# Patient Record
Sex: FEMALE | Race: WHITE | NOT HISPANIC OR LATINO | Employment: FULL TIME | URBAN - METROPOLITAN AREA
[De-identification: names, ages, dates, MRNs, and addresses within clinical notes are randomized per-mention and may not be internally consistent; named-entity substitution may affect disease eponyms.]

---

## 2023-07-05 ENCOUNTER — OFFICE VISIT (OUTPATIENT)
Dept: PRIMARY CARE | Facility: CLINIC | Age: 43
End: 2023-07-05
Payer: COMMERCIAL

## 2023-07-05 VITALS
SYSTOLIC BLOOD PRESSURE: 111 MMHG | HEART RATE: 68 BPM | OXYGEN SATURATION: 99 % | BODY MASS INDEX: 21.58 KG/M2 | TEMPERATURE: 97.3 F | WEIGHT: 118 LBS | RESPIRATION RATE: 16 BRPM | DIASTOLIC BLOOD PRESSURE: 76 MMHG

## 2023-07-05 DIAGNOSIS — R11.2 NAUSEA AND VOMITING, UNSPECIFIED VOMITING TYPE: ICD-10-CM

## 2023-07-05 DIAGNOSIS — H65.92 LEFT SEROUS OTITIS MEDIA, UNSPECIFIED CHRONICITY: Primary | ICD-10-CM

## 2023-07-05 PROBLEM — J30.1 HAY FEVER: Status: ACTIVE | Noted: 2023-07-05

## 2023-07-05 PROBLEM — N60.02 BILATERAL BREAST CYSTS: Status: ACTIVE | Noted: 2023-07-05

## 2023-07-05 PROBLEM — B34.9 VIRAL INFECTION: Status: RESOLVED | Noted: 2023-07-05 | Resolved: 2023-07-05

## 2023-07-05 PROBLEM — N60.01 BILATERAL BREAST CYSTS: Status: ACTIVE | Noted: 2023-07-05

## 2023-07-05 PROBLEM — E83.119 HEMOCHROMATOSIS: Status: ACTIVE | Noted: 2023-07-05

## 2023-07-05 PROCEDURE — 99213 OFFICE O/P EST LOW 20 MIN: CPT | Performed by: NURSE PRACTITIONER

## 2023-07-05 PROCEDURE — 1036F TOBACCO NON-USER: CPT | Performed by: NURSE PRACTITIONER

## 2023-07-05 RX ORDER — FAMOTIDINE 20 MG/1
20 TABLET, FILM COATED ORAL 2 TIMES DAILY
COMMUNITY
Start: 2023-01-06

## 2023-07-05 RX ORDER — AMOXICILLIN 875 MG/1
875 TABLET, FILM COATED ORAL 2 TIMES DAILY
Qty: 20 TABLET | Refills: 0 | Status: SHIPPED | OUTPATIENT
Start: 2023-07-05 | End: 2023-07-12 | Stop reason: SDUPTHER

## 2023-07-05 RX ORDER — ONDANSETRON 4 MG/1
4 TABLET, ORALLY DISINTEGRATING ORAL EVERY 8 HOURS PRN
Qty: 20 TABLET | Refills: 0 | Status: SHIPPED | OUTPATIENT
Start: 2023-07-05 | End: 2023-07-12

## 2023-07-05 ASSESSMENT — ENCOUNTER SYMPTOMS
ANOREXIA: 0
SORE THROAT: 0
NECK STIFFNESS: 0
NECK PAIN: 0
FATIGUE: 0
DIZZINESS: 1
NUMBNESS: 0
PALPITATIONS: 0
CHANGE IN BOWEL HABIT: 0
WEAKNESS: 0
HEMATOLOGIC/LYMPHATIC NEGATIVE: 1
DIAPHORESIS: 0
RHINORRHEA: 0
COUGH: 0
NAUSEA: 1
CARDIOVASCULAR NEGATIVE: 1
ALLERGIC/IMMUNOLOGIC NEGATIVE: 1
HEADACHES: 1
JOINT SWELLING: 0
PSYCHIATRIC NEGATIVE: 1
FEVER: 0
MUSCULOSKELETAL NEGATIVE: 1
ARTHRALGIAS: 0
CHILLS: 0
VERTIGO: 0
EYES NEGATIVE: 1
ENDOCRINE NEGATIVE: 1
VOMITING: 1
MYALGIAS: 0
CONSTITUTIONAL NEGATIVE: 1
RESPIRATORY NEGATIVE: 1
SWOLLEN GLANDS: 0
VISUAL CHANGE: 0
ABDOMINAL PAIN: 0

## 2023-07-05 NOTE — PATIENT INSTRUCTIONS
Patient was seen and examined.  Diagnosis, treatment, and possible complications of today's illness discussed and explained to patient.  Patient to take medications associated with this visit.  Patient may also take OTC analgesic/antipyretic if needed for pain/fever.  Advised to increase oral fluid intake as tolerated.  Patient educated and advised to come back if worsening or persistent symptoms. Patient educated on when to seek urgent/emergent care. Patient was given opportunity to ask questions and denied any at this time.  Patient verbalized understanding and agrees with plan of care.

## 2023-07-05 NOTE — ASSESSMENT & PLAN NOTE
Patient to continue to take zyrtec and benadryl as needed.  Patient will take ondansteron as needed for nausea and vomiting.

## 2023-07-05 NOTE — PROGRESS NOTES
Subjective   Patient ID: Tee Laughlin is a 42 y.o. female who presents for Earache (Onset: 07.05.2023 /Symptoms: LEFT ear ache, dizziness, vomited once , /OTC: dramamine  /Denies:  fever,  congestion, headache, cough).  Patient presents to convenient care appointment with complaint of nausea, vomiting rolling from side to side.  Patient has not been taking any OTC  for symptoms relief.  Patient denies chest pain, shortness of breath, nor diarrhea.  Patient has been eating and drinking without difficulty.  Patient reports swimming in ocean last week.     Dizziness  This is a new problem. The current episode started yesterday. The problem occurs intermittently. The problem has been unchanged. Associated symptoms include headaches, nausea and vomiting. Pertinent negatives include no abdominal pain, anorexia, arthralgias, change in bowel habit, chest pain, chills, congestion, coughing, diaphoresis, fatigue, fever, joint swelling, myalgias, neck pain, numbness, rash, sore throat, swollen glands, urinary symptoms, vertigo, visual change or weakness. The symptoms are aggravated by twisting (rolling side to side). She has tried nothing for the symptoms.       Review of Systems   Constitutional: Negative.  Negative for chills, diaphoresis, fatigue and fever.   HENT: Negative.  Negative for congestion, ear pain, postnasal drip, rhinorrhea and sore throat. Drooling: otitis medica.   Eyes: Negative.    Respiratory: Negative.  Negative for cough.    Cardiovascular: Negative.  Negative for chest pain, palpitations and leg swelling.   Gastrointestinal:  Positive for nausea and vomiting. Negative for abdominal pain, anorexia and change in bowel habit.   Endocrine: Negative.    Genitourinary: Negative.    Musculoskeletal: Negative.  Negative for arthralgias, joint swelling, myalgias, neck pain and neck stiffness.   Skin: Negative.  Negative for rash.   Allergic/Immunologic: Negative.    Neurological:  Positive for dizziness and  headaches. Negative for vertigo, weakness and numbness.   Hematological: Negative.    Psychiatric/Behavioral: Negative.     All other systems reviewed and are negative.      /76   Pulse 68   Temp 36.3 °C (97.3 °F)   Resp 16   Wt 53.5 kg (118 lb)   SpO2 99%   BMI 21.58 kg/m²     Objective   Physical Exam  Vitals and nursing note reviewed.   Constitutional:       Appearance: Normal appearance.   HENT:      Head: Normocephalic and atraumatic.      Right Ear: Tympanic membrane, ear canal and external ear normal.      Left Ear: External ear normal.      Ears:      Comments: Moderate erythema and bulging TM left ear.  TM intact bilaterally.     Nose: Nose normal.      Mouth/Throat:      Mouth: Mucous membranes are moist.      Pharynx: Oropharynx is clear.   Eyes:      Extraocular Movements: Extraocular movements intact.      Conjunctiva/sclera: Conjunctivae normal.      Pupils: Pupils are equal, round, and reactive to light.   Cardiovascular:      Rate and Rhythm: Normal rate and regular rhythm.      Pulses: Normal pulses.      Heart sounds: Normal heart sounds.   Pulmonary:      Effort: Pulmonary effort is normal.      Breath sounds: No wheezing, rhonchi or rales.   Musculoskeletal:         General: Normal range of motion.      Cervical back: Normal range of motion and neck supple.   Skin:     General: Skin is warm and dry.      Capillary Refill: Capillary refill takes less than 2 seconds.   Neurological:      General: No focal deficit present.      Mental Status: She is alert and oriented to person, place, and time.   Psychiatric:         Mood and Affect: Mood normal.         Behavior: Behavior normal.         Assessment/Plan   Problem List Items Addressed This Visit    None

## 2023-07-12 ENCOUNTER — TELEPHONE (OUTPATIENT)
Dept: PRIMARY CARE | Facility: CLINIC | Age: 43
End: 2023-07-12
Payer: COMMERCIAL

## 2023-07-12 DIAGNOSIS — H65.92 LEFT SEROUS OTITIS MEDIA, UNSPECIFIED CHRONICITY: ICD-10-CM

## 2023-07-12 RX ORDER — AMOXICILLIN 875 MG/1
875 TABLET, FILM COATED ORAL 2 TIMES DAILY
Qty: 14 TABLET | Refills: 0 | Status: SHIPPED | OUTPATIENT
Start: 2023-07-12 | End: 2023-07-19

## 2023-07-12 NOTE — PROGRESS NOTES
Patient called office to reports she has not taken medication due to losing prescription after 3 days of treatment.  Amoxicillin 875 mg PO x 7 days sent to pharmacy.

## 2023-07-12 NOTE — TELEPHONE ENCOUNTER
Patient called stating she was prescribed amoxicillin for an ear infection on 7/5, she stated she was not finished with the prescription and has lost the pill bottle. She is requesting another prescription send to [facility]   Please advise

## 2023-09-11 ENCOUNTER — OFFICE VISIT (OUTPATIENT)
Dept: PRIMARY CARE | Facility: CLINIC | Age: 43
End: 2023-09-11
Payer: COMMERCIAL

## 2023-09-11 VITALS
BODY MASS INDEX: 22.34 KG/M2 | HEIGHT: 62 IN | HEART RATE: 68 BPM | SYSTOLIC BLOOD PRESSURE: 110 MMHG | TEMPERATURE: 98.4 F | WEIGHT: 121.4 LBS | DIASTOLIC BLOOD PRESSURE: 70 MMHG | RESPIRATION RATE: 12 BRPM

## 2023-09-11 DIAGNOSIS — M77.9 TENDONITIS: ICD-10-CM

## 2023-09-11 DIAGNOSIS — M54.31 RIGHT SIDED SCIATICA: ICD-10-CM

## 2023-09-11 DIAGNOSIS — B00.1 COLD SORE: ICD-10-CM

## 2023-09-11 DIAGNOSIS — M79.645 PAIN OF LEFT THUMB: ICD-10-CM

## 2023-09-11 DIAGNOSIS — S33.6XXA SPRAIN OF SACROILIAC LIGAMENT, INITIAL ENCOUNTER: Primary | ICD-10-CM

## 2023-09-11 PROCEDURE — 1036F TOBACCO NON-USER: CPT | Performed by: FAMILY MEDICINE

## 2023-09-11 PROCEDURE — 99213 OFFICE O/P EST LOW 20 MIN: CPT | Performed by: FAMILY MEDICINE

## 2023-09-11 RX ORDER — METHYLPREDNISOLONE 4 MG/1
TABLET ORAL
Qty: 21 TABLET | Refills: 0 | Status: SHIPPED | OUTPATIENT
Start: 2023-09-11 | End: 2023-12-15 | Stop reason: ALTCHOICE

## 2023-09-11 RX ORDER — VALACYCLOVIR HYDROCHLORIDE 500 MG/1
500 TABLET, FILM COATED ORAL 2 TIMES DAILY
Qty: 14 TABLET | Refills: 1 | Status: SHIPPED | OUTPATIENT
Start: 2023-09-11 | End: 2023-09-25

## 2023-09-11 ASSESSMENT — PATIENT HEALTH QUESTIONNAIRE - PHQ9
1. LITTLE INTEREST OR PLEASURE IN DOING THINGS: NOT AT ALL
2. FEELING DOWN, DEPRESSED OR HOPELESS: NOT AT ALL
SUM OF ALL RESPONSES TO PHQ9 QUESTIONS 1 AND 2: 0

## 2023-09-11 NOTE — PROGRESS NOTES
"Subjective   Patient ID: Tee Laughlin is a 42 y.o. female who presents for Sciatica and Thumb Pain.  HPI  Patient presents today complaining of pain in her left thumb. It has been present for 1 year. This is waxing, and waning. No injury, or swelling. Was seen last year for this. Took OTC pain reliever with relief.       Also complaining of right sciatic nerve pain x  6 weeks. Has gotten this in the past. States usually stretches, heat, and ice help, but not this time. Has been working nights on computer, and sitting a lot.     Problem list discussed.    Overall doing well.  Eating okay.  Staying active.    Has no other new problem /question.    ROS  Constitutional- No activity change. No appetite change.  Eyes- Denies vision changes.  Respiratory- No shortness of breath.  Cardiovascular- No palpitations. No chest pain.  GI- No nausea or vomiting. No diarrhea or constipation. Denies abdominal pain.  Musculoskeletal- myalgias  Extremities- No edema.  Neurological- Denies headaches. Denies dizziness.  Skin- cold sore  Psychiatric/Behavioral- Denies significant anxiety, or depressed mood.     Objective     /70   Pulse 68   Temp 36.9 °C (98.4 °F)   Resp 12   Ht 1.575 m (5' 2\")   Wt 55.1 kg (121 lb 6.4 oz)   LMP 08/07/2023 (Exact Date)   BMI 22.20 kg/m²     No Known Allergies    Constitutional-- Well-nourished.  No distress  Head- unremarkable.  Ears- TMs clear.  Eyes- PERRL.  Conjunctiva normal.  Nose- Normal.  No rhinorrhea noted.  Throat- Oropharynx is clear and moist.  Neck- Supple with no thyromegaly.  No significant cervical adenopathy noted.  Pulmonary/Chest- Breath sounds normal with normal effort.  No wheezing.  Heart- Regular rate and rhythm.  No murmur.  Abdomen- Soft and non-tender.  No masses noted.  Musculoskeletal- Normal ROM.  Mild pain with range of motion of that thumb.  Mild pain lower back tailbone area on that side with mild right-sided radiculopathy.  Extremities- No edema. "   Neurological- Alert.  No noted deficits.  Skin- Warm.  Right lower lip cold sore noted  Psychiatric/Behavioral- Mood and affect normal.  Behavior normal.     Assessment/Plan   1. Sprain of sacroiliac ligament, initial encounter  methylPREDNISolone (Medrol Dospak) 4 mg tablets      2. Right sided sciatica  methylPREDNISolone (Medrol Dospak) 4 mg tablets      3. Pain of left thumb        4. Tendonitis  methylPREDNISolone (Medrol Dospak) 4 mg tablets      5. Cold sore  valACYclovir (Valtrex) 500 mg tablet             Long talk. Treatment options reviewed.    Discussed right sided sacroiliac back pain.  Advised patient to take steroid pack as discussed.  Continue to monitor symptoms.      Discussed left sided thumb pain.  Educated on Tendonitis.      Discussed cold sore.  Advised patient to take Valtrex as directed.      Continue and take your medications as prescribed.    Health Maintenance issues discussed.    Importance of healthy diet and regular exercise regimen discussed.    We will contact you with any test results ordered. If you do not hear from us, please contact.    Follow-up as instructed or sooner if any problems or symptoms do not resolve as expected.      Scribe Attestation  By signing my name below, IDebi Scribe   attest that this documentation has been prepared under the direction and in the presence of Dontrell Blue MD.

## 2023-10-30 ENCOUNTER — OFFICE VISIT (OUTPATIENT)
Dept: PRIMARY CARE | Facility: CLINIC | Age: 43
End: 2023-10-30
Payer: COMMERCIAL

## 2023-10-30 VITALS
SYSTOLIC BLOOD PRESSURE: 110 MMHG | HEART RATE: 70 BPM | WEIGHT: 121.6 LBS | RESPIRATION RATE: 18 BRPM | DIASTOLIC BLOOD PRESSURE: 60 MMHG | BODY MASS INDEX: 22.96 KG/M2 | OXYGEN SATURATION: 98 % | TEMPERATURE: 98 F | HEIGHT: 61 IN

## 2023-10-30 DIAGNOSIS — M54.41 CHRONIC RIGHT-SIDED LOW BACK PAIN WITH RIGHT-SIDED SCIATICA: ICD-10-CM

## 2023-10-30 DIAGNOSIS — S33.6XXA SPRAIN OF SACROILIAC LIGAMENT, INITIAL ENCOUNTER: Primary | ICD-10-CM

## 2023-10-30 DIAGNOSIS — G89.29 CHRONIC RIGHT-SIDED LOW BACK PAIN WITH RIGHT-SIDED SCIATICA: ICD-10-CM

## 2023-10-30 DIAGNOSIS — G40.909 SEIZURE DISORDER (MULTI): ICD-10-CM

## 2023-10-30 PROCEDURE — 1036F TOBACCO NON-USER: CPT | Performed by: FAMILY MEDICINE

## 2023-10-30 PROCEDURE — 99213 OFFICE O/P EST LOW 20 MIN: CPT | Performed by: FAMILY MEDICINE

## 2023-10-30 RX ORDER — PREDNISONE 10 MG/1
TABLET ORAL
Qty: 24 TABLET | Refills: 0 | Status: SHIPPED | OUTPATIENT
Start: 2023-10-30 | End: 2023-12-15 | Stop reason: ALTCHOICE

## 2023-10-30 NOTE — PROGRESS NOTES
"Subjective   Patient ID: Tee Laughlin is a 43 y.o. female who presents for Back Pain.  HPI    Patient presents in office today for back pain. Ongoing for a while. Patient admits that she has tried ibuprofen OTC 3 times a day for the last few weeks. Admits to/denies any injury to her back. Admits that the pain is 5-6/10. Admits that this is sciatic nerve pain.    Taking current medications which were reviewed.  Problem list discussed.    Overall doing well.  Eating okay.  Staying active.    Has no other new problem /question.     ROS  Constitutional- No activity change. No appetite change.  Eyes- Denies vision changes.  Respiratory- No shortness of breath.  Cardiovascular- No palpitations. No chest pain.  GI- No nausea or vomiting. No diarrhea or constipation. Denies abdominal pain.  Musculoskeletal- myalgias   Extremities- No edema.  Neurological- Denies headaches. Denies dizziness.  Skin- No rashes.  Psychiatric/Behavioral- Denies significant anxiety, or depressed mood.     Objective     /60   Pulse 70   Temp 36.7 °C (98 °F) (Temporal)   Resp 18   Ht 1.549 m (5' 1\")   Wt 55.2 kg (121 lb 9.6 oz)   SpO2 98%   BMI 22.98 kg/m²     No Known Allergies    Constitutional-- Well-nourished.  No distress  Eyes- PERRL.  Conjunctiva normal.  Nose- Normal.  No rhinorrhea noted.  Throat- Oropharynx is clear and moist.  Neck- Supple with no thyromegaly.  No significant cervical adenopathy noted.  Pulmonary/Chest- Breath sounds normal with normal effort.  No wheezing.  Heart- Regular rate and rhythm.  No murmur.  Abdomen- Soft and non-tender.  No masses noted.  Musculoskeletal-right very low back pain localizing to the SI joint area extending slightly to the upper buttock.  Extremities- No edema.   Neurological- Alert.  No noted deficits.  Skin- Warm.  No rashes.      Assessment/Plan   1. Sprain of sacroiliac ligament, initial encounter  predniSONE (Deltasone) 10 mg tablet    XR lumbar spine complete 4+ views    "   2. Chronic right-sided low back pain with right-sided sciatica  XR lumbar spine complete 4+ views      3. Seizure disorder (CMS/HCC)               Long talk. Treatment options reviewed.    Discussed chronic right sided back pain.  Educated on sacroiliitis.  Advised patient to rest.  Continue to monitor symptoms.  Complete x ray.  Take steroid pack as discussed.     Continue and take your medications as prescribed.    Health Maintenance issues discussed.      We will contact you with any test results ordered. If you do not hear from us, please contact.  Further work-up and treatment depending on how she does and test results  Follow-up as instructed or sooner if any problems or symptoms do not resolve as expected.    Scribe Attestation  By signing my name below, IDebi Scribe   attest that this documentation has been prepared under the direction and in the presence of Dontrell Blue MD.

## 2023-10-31 ENCOUNTER — ANCILLARY PROCEDURE (OUTPATIENT)
Dept: RADIOLOGY | Facility: CLINIC | Age: 43
End: 2023-10-31
Payer: COMMERCIAL

## 2023-10-31 DIAGNOSIS — S33.6XXA SPRAIN OF SACROILIAC LIGAMENT, INITIAL ENCOUNTER: ICD-10-CM

## 2023-10-31 DIAGNOSIS — M54.41 CHRONIC RIGHT-SIDED LOW BACK PAIN WITH RIGHT-SIDED SCIATICA: ICD-10-CM

## 2023-10-31 DIAGNOSIS — G89.29 CHRONIC RIGHT-SIDED LOW BACK PAIN WITH RIGHT-SIDED SCIATICA: ICD-10-CM

## 2023-10-31 PROCEDURE — 72110 X-RAY EXAM L-2 SPINE 4/>VWS: CPT | Mod: FY

## 2023-10-31 PROCEDURE — 72110 X-RAY EXAM L-2 SPINE 4/>VWS: CPT | Performed by: STUDENT IN AN ORGANIZED HEALTH CARE EDUCATION/TRAINING PROGRAM

## 2023-11-02 ENCOUNTER — TELEPHONE (OUTPATIENT)
Dept: PRIMARY CARE | Facility: CLINIC | Age: 43
End: 2023-11-02
Payer: COMMERCIAL

## 2023-11-02 NOTE — TELEPHONE ENCOUNTER
----- Message from Dontrell Blue MD sent at 11/2/2023  7:15 AM EDT -----  X-ray of your lower back shows only minimal arthritis changes.  Follow-up or call if back symptoms persist in any significant way.  Please let her know

## 2023-11-06 ENCOUNTER — TELEPHONE (OUTPATIENT)
Dept: PRIMARY CARE | Facility: CLINIC | Age: 43
End: 2023-11-06
Payer: COMMERCIAL

## 2023-11-06 DIAGNOSIS — M54.41 CHRONIC RIGHT-SIDED LOW BACK PAIN WITH RIGHT-SIDED SCIATICA: ICD-10-CM

## 2023-11-06 DIAGNOSIS — G89.29 CHRONIC RIGHT-SIDED LOW BACK PAIN WITH RIGHT-SIDED SCIATICA: ICD-10-CM

## 2023-11-06 RX ORDER — MELOXICAM 15 MG/1
15 TABLET ORAL DAILY
Qty: 30 TABLET | Refills: 1 | Status: SHIPPED | OUTPATIENT
Start: 2023-11-06 | End: 2024-05-14 | Stop reason: WASHOUT

## 2023-11-06 NOTE — TELEPHONE ENCOUNTER
Please let her know that she may notice improvement in a day or 2 but it can reach a peak in a week or 2.     Patient aware

## 2023-11-06 NOTE — TELEPHONE ENCOUNTER
Patient is calling because she saw you on 10/30/23 for lower back pain and was given a Prednisone taper and states you told her to call back if it wasn't helping. She states when she was taking the first 5 days of the Prednisone, the strongest doses, it wasn't even helping with the lower back. She's on day 6 and it's not helping. Wanted to know what you advise  Thanks      Patient's # [phone]    Preferred pharmacy [facility] [place]

## 2023-11-06 NOTE — TELEPHONE ENCOUNTER
"Patient wondering how long Meloxicam takes to \"kick in\", and if there are any limitations with taking it? Please advise.  "

## 2023-11-07 NOTE — PROGRESS NOTES
Physical Therapy    Physical Therapy Evaluation    Patient Name: Tee Laughlin  MRN: [id number]  Today's Date: 11/8/23  Time Calculation  Start Time: 1355  Stop Time: 1430  Time Calculation (min): 35 min    Insurance  [facility] [place]  10% coinsurance, $40 copay   ANITA HANEY not required  Visit: 1  POC: 10    Assessment  44 y/o F presents to outpatient physical therapy with reports of low back and intermittent right lower extremity pain lasting for the last few years. The patient presents with the current impairments of pain, decreased ROM, increased muscle tension, and weakness. These impairments currently limit their ability to bend, lift, sleep, carry objects, and participate in recreation activities. Due to the limitations listed above, the patient is currently at a decreased functional level compared to baseline, and they would benefit from skilled physical therapy to improve pain intensity, strength, flexibility, and facilitate a safe and efficient return to functional baseline. Patient's prognosis for improvement with therapy is good at this time.  PT Assessment Results: Decreased strength, Decreased range of motion, Pain  Rehab Prognosis: Good  Evaluation/Treatment Tolerance: Patient tolerated treatment well    Plan  Treatment/Interventions: Aquatic therapy, Cryotherapy, Dry needling, Education/ Instruction, Electrical stimulation, Hot pack, Manual therapy, Mechanical traction, Neuromuscular re-education, Self care/ home management, Taping techniques, Therapeutic activities, Therapeutic exercises, Ultrasound, Vasopneumatic device  PT Plan: Skilled PT  PT Frequency:  (2x/week x2-3 weeks, 1x/week x3 weeks)  Duration:  (9 additional visits)  Onset Date: 11/06/23  Certification Period Start Date: 11/08/23  Certification Period End Date: 02/06/24  Rehab Potential: Good  Plan of Care Agreement: Patient    Complexity  Low    Current Problem  1. Chronic right-sided low back pain with right-sided sciatica  Referral to  Physical Therapy    Follow Up In Physical Therapy          Subjective   Patient reports to OPPT with complaints of back pain for many years. 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 In July she reports that the sciatic area on the right started to flare up. She has started to reduce her activity but the pain has only gotten worse since then. The pain is across the entire tailbone, and will intermittently shoot down the right posterior leg. Increased pain with bending, lifting, sleeping, and carrying. Pain is 5/10 at this time, 7/10 at worst, 3/10 at best. Denies numbness and tingling.   General:  General  Reason for Referral: low back pain and right lower extremity pain  Referred By: Dr. Dontrell Blue  Precautions:     Pain:  Pain Assessment: 0-10  Pain Score: 5 - Moderate pain  Pain Type: Chronic pain  Pain Location: Back  Pain Orientation: Right, Left  Pain Radiating Towards: R knee  Home Living:  Home Living Comment: Lives with  who is recently out of surgery and she is assisting as needed  Prior Function Per Pt/Caregiver Report:  Prior Function Comments: Independent with all desired activities and ADLs    Objective   Lumbar AROM:  Flexion to ankles*  Extension WFL  RSB to lateral joint line  LSB to lateral joint line     LE Strength:  Hip flexion R 4/5, L 4+/5  Hip abd R 4/5, L 4+/5  Hip ext R 4-/5*, L 4+/5  Knee flexion R 4/5, L 5/5  Knee extension R 5/5, L 5/5    HS length (90/90):  R -14*  L -10    Gait: decreased stance time on R, increased lateral sway over right lower extremity during stance  Dermatomes: intact  Palpation: TTP along the right lumbar paraspinals, R QL, right gluteus medius, and R piriformis  Observation: R anteriorly rotated illium  Joint mobility: hypermobile PA mobility L1-L5, mild increase in pain with right lateral mob to sacrum  Special Tests: - compression, - gapping, + sacral thrust, - thigh thrust, + slump on R  XR lumbar 10/30/23: No  acute findings of the lumbar spine     Treatment:  MET to correct R anteriorly rotated illium  HEP reviewed   (5')    Outcome Measures:  Other Measures  Oswestry Disablity Index (ANETA): 25; 50%     OP EDUCATION:  Education  Individual(s) Educated: Patient  Education Provided: Anatomy, Body Mechanics, Home Exercise Program, Home Safety, Physiology, POC  Risk and Benefits Discussed with Patient/Caregiver/Other: yes  Patient/Caregiver Demonstrated Understanding: yes  Plan of Care Discussed and Agreed Upon: yes  Patient Response to Education: Patient/Caregiver Verbalized Understanding of Information, Patient/Caregiver Asked Appropriate Questions  HEP:  Exercises  - Supine Piriformis Stretch with Foot on Ground  - 2 x daily - 7 x weekly - 1 sets - 3 reps - 30sec hold  - Supine Posterior Pelvic Tilt  - 1 x daily - 7 x weekly - 2 sets - 10 reps  - Supine Pelvic Tilt with Straight Leg Raise  - 1 x daily - 7 x weekly - 2 sets - 10 reps  - Supine March with Alternating Leg Lifts  - 1 x daily - 7 x weekly - 2 sets - 10 reps  Goals:  By discharge:  1. Patient will report and demonstrate independence with established HEP  2. Patient will demonstrate ability to bend and lift 10lb from the floor to waist height 5x consecutively without an increase in low back pain  3. Patient will increase gross hip and knee strength to >/= 4+/5 to improve their ability to stand, ambulate, lift, and perform all work duties without restrictions  4. Patent will report improvement in low back and right lower extremity pain by 75% since the start of therapy to allow for an increased ability to perform ADLs and work duties  5. Patient will demonstrate a decrease in Modified Oswestry Low Back Disability Index score by 10 pts to 15/50 to meet established MCID for the outcome measure (baseline 11/8/23 25/50)  6. Patient will demonstrate the ability to perform a 25# box lift and carry 50' x2 without pain in the lumbar spine exceeding 2/10 to improve her  ability to perform light-moderate activities within the home  7. Patient will report the ability to sleep through the night 4/7 nights per week uninterrupted by pain to improve overall function throughout the day  8. Patient will report the ability to return to recreation activities including working out at the gym without pain in the low back or right lower extremity exceeding 2/10 to allow for improved overall fitness level   9. Patient will demonstrate the ability to perform a static 10lb lift and twist transfer from side to side 10x consecutively without an increase in low back pain

## 2023-11-08 ENCOUNTER — EVALUATION (OUTPATIENT)
Dept: PHYSICAL THERAPY | Facility: CLINIC | Age: 43
End: 2023-11-08
Payer: COMMERCIAL

## 2023-11-08 DIAGNOSIS — G89.29 CHRONIC RIGHT-SIDED LOW BACK PAIN WITH RIGHT-SIDED SCIATICA: ICD-10-CM

## 2023-11-08 DIAGNOSIS — M54.41 CHRONIC RIGHT-SIDED LOW BACK PAIN WITH RIGHT-SIDED SCIATICA: ICD-10-CM

## 2023-11-08 PROCEDURE — 97161 PT EVAL LOW COMPLEX 20 MIN: CPT | Mod: GP | Performed by: PHYSICAL THERAPIST

## 2023-11-08 ASSESSMENT — PAIN - FUNCTIONAL ASSESSMENT: PAIN_FUNCTIONAL_ASSESSMENT: 0-10

## 2023-11-08 ASSESSMENT — PAIN SCALES - GENERAL: PAINLEVEL_OUTOF10: 5 - MODERATE PAIN

## 2023-11-08 NOTE — LETTER
November 9, 2023    Braden Frederick PT  [address] [facility]  Rehab Services  [facility] [address]    Patient: Tee Laughlin   YOB: 1980   Date of Visit: 11/8/2023       Dear Dontrell Blue Md  [address] [place] [address]    The attached plan of care is being sent to you because your patient’s medical reimbursement requires that you certify the plan of care. Your signature is required to allow uninterrupted insurance coverage.      You may indicate your approval by signing below and faxing this form back to us at Dept Fax: [phone].    Please call Dept: [phone] with any questions or concerns.    Thank you for this referral,        Braden Frederick PT  [place] [address] [facility]  [address] [facility] [address]    Payer: Payor: [facility] / Plan: [facility] / Product Type: *No Product type* /                                                                         Date:     Dear Braden Frederick PT,     Re: Ms. Tee Laughlin, MRN:[id number]    I certify that I have reviewed the attached plan of care and it is medically necessary for Ms. Tee Laughlin (1980) who is under my care.          ______________________________________                    _________________  Provider name and credentials                                           Date and time                                                                                           Plan of Care 11/9/23   Effective from: 11/9/2023  Effective to: 2/6/2024    Plan ID: [id number]            Participants as of Finalize on 11/9/2023    Name Type Comments Contact Info    Dontrell Blue MD PCP - [facility] PCP  [phone]    Braden Frederick PT Physical Therapist  [phone]       Last Plan Note     Author: Braden Frederick PT Status: Sign when Signing Visit Last edited: 11/8/2023  1:45 PM       Physical Therapy    Physical Therapy  Evaluation    Patient Name: Tee Laughlin  MRN: [id number]  Today's Date: 11/8/23  Time Calculation  Start Time: 1355  Stop Time: 1430  Time Calculation (min): 35 min    Insurance  [facility]  10% coinsurance, $40 copay   ANITA HANEY not required  Visit: 1  POC: 10    Assessment  42 y/o F presents to outpatient physical therapy with reports of low back and intermittent right lower extremity pain lasting for the last few years. The patient presents with the current impairments of pain, decreased ROM, increased muscle tension, and weakness. These impairments currently limit their ability to bend, lift, sleep, carry objects, and participate in recreation activities. Due to the limitations listed above, the patient is currently at a decreased functional level compared to baseline, and they would benefit from skilled physical therapy to improve pain intensity, strength, flexibility, and facilitate a safe and efficient return to functional baseline. Patient's prognosis for improvement with therapy is good at this time.  PT Assessment Results: Decreased strength, Decreased range of motion, Pain  Rehab Prognosis: Good  Evaluation/Treatment Tolerance: Patient tolerated treatment well    Plan  Treatment/Interventions: Aquatic therapy, Cryotherapy, Dry needling, Education/ Instruction, Electrical stimulation, Hot pack, Manual therapy, Mechanical traction, Neuromuscular re-education, Self care/ home management, Taping techniques, Therapeutic activities, Therapeutic exercises, Ultrasound, Vasopneumatic device  PT Plan: Skilled PT  PT Frequency:  (2x/week x2-3 weeks, 1x/week x3 weeks)  Duration:  (9 additional visits)  Onset Date: 11/06/23  Certification Period Start Date: 11/08/23  Certification Period End Date: 02/06/24  Rehab Potential: Good  Plan of Care Agreement: Patient    Complexity  Low    Current Problem  1. Chronic right-sided low back pain with right-sided sciatica  Referral to Physical Therapy    Follow Up In  Physical Therapy          Subjective   Patient reports to OPPT with complaints of back pain for many years. 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 In July she reports that the sciatic area on the right started to flare up. She has started to reduce her activity but the pain has only gotten worse since then. The pain is across the entire tailbone, and will intermittently shoot down the right posterior leg. Increased pain with bending, lifting, sleeping, and carrying. Pain is 5/10 at this time, 7/10 at worst, 3/10 at best. Denies numbness and tingling.   General:  General  Reason for Referral: low back pain and right lower extremity pain  Referred By: Dr. Dontrell Blue  Precautions:     Pain:  Pain Assessment: 0-10  Pain Score: 5 - Moderate pain  Pain Type: Chronic pain  Pain Location: Back  Pain Orientation: Right, Left  Pain Radiating Towards: R knee  Home Living:  Home Living Comment: Lives with  who is recently out of surgery and she is assisting as needed  Prior Function Per Pt/Caregiver Report:  Prior Function Comments: Independent with all desired activities and ADLs    Objective   Lumbar AROM:  Flexion to ankles*  Extension WFL  RSB to lateral joint line  LSB to lateral joint line     LE Strength:  Hip flexion R 4/5, L 4+/5  Hip abd R 4/5, L 4+/5  Hip ext R 4-/5*, L 4+/5  Knee flexion R 4/5, L 5/5  Knee extension R 5/5, L 5/5    HS length (90/90):  R -14*  L -10    Gait: decreased stance time on R, increased lateral sway over right lower extremity during stance  Dermatomes: intact  Palpation: TTP along the right lumbar paraspinals, R QL, right gluteus medius, and R piriformis  Observation: R anteriorly rotated illium  Joint mobility: hypermobile PA mobility L1-L5, mild increase in pain with right lateral mob to sacrum  Special Tests: - compression, - gapping, + sacral thrust, - thigh thrust, + slump on R  XR lumbar 10/30/23: No acute findings of the lumbar spine      Treatment:  MET to correct R anteriorly rotated illium  HEP reviewed   (5')    Outcome Measures:  Other Measures  Oswestry Disablity Index (ANETA): 25; 50%     OP EDUCATION:  Education  Individual(s) Educated: Patient  Education Provided: Anatomy, Body Mechanics, Home Exercise Program, Home Safety, Physiology, POC  Risk and Benefits Discussed with Patient/Caregiver/Other: yes  Patient/Caregiver Demonstrated Understanding: yes  Plan of Care Discussed and Agreed Upon: yes  Patient Response to Education: Patient/Caregiver Verbalized Understanding of Information, Patient/Caregiver Asked Appropriate Questions  HEP:  Exercises  - Supine Piriformis Stretch with Foot on Ground  - 2 x daily - 7 x weekly - 1 sets - 3 reps - 30sec hold  - Supine Posterior Pelvic Tilt  - 1 x daily - 7 x weekly - 2 sets - 10 reps  - Supine Pelvic Tilt with Straight Leg Raise  - 1 x daily - 7 x weekly - 2 sets - 10 reps  - Supine March with Alternating Leg Lifts  - 1 x daily - 7 x weekly - 2 sets - 10 reps  Goals:  By discharge:  1. Patient will report and demonstrate independence with established HEP  2. Patient will demonstrate ability to bend and lift 10lb from the floor to waist height 5x consecutively without an increase in low back pain  3. Patient will increase gross hip and knee strength to >/= 4+/5 to improve their ability to stand, ambulate, lift, and perform all work duties without restrictions  4. Patent will report improvement in low back and right lower extremity pain by 75% since the start of therapy to allow for an increased ability to perform ADLs and work duties  5. Patient will demonstrate a decrease in Modified Oswestry Low Back Disability Index score by 10 pts to 15/50 to meet established MCID for the outcome measure (baseline 11/8/23 25/50)  6. Patient will demonstrate the ability to perform a 25# box lift and carry 50' x2 without pain in the lumbar spine exceeding 2/10 to improve her ability to perform light-moderate  activities within the home  7. Patient will report the ability to sleep through the night 4/7 nights per week uninterrupted by pain to improve overall function throughout the day  8. Patient will report the ability to return to recreation activities including working out at the gym without pain in the low back or right lower extremity exceeding 2/10 to allow for improved overall fitness level   9. Patient will demonstrate the ability to perform a static 10lb lift and twist transfer from side to side 10x consecutively without an increase in low back pain          Current Participants as of 11/9/2023    Name Type Comments Contact Info    Dontrell Blue MD PCP - [facility] PCP  [phone]    Signature pending    Braden Frederick PT Physical Therapist  [phone]    Signature pending

## 2023-11-08 NOTE — LETTER
November 9, 2023     Patient: Tee Laughlin   YOB: 1980   Date of Visit: 11/8/2023       To Whom It May Concern:    It is my medical opinion that Tee Laughlin {Work release (duty restriction):00134}.    If you have any questions or concerns, please don't hesitate to call.         Sincerely,        Braden Frederick, PT    CC: No Recipients

## 2023-11-08 NOTE — PATIENT INSTRUCTIONS
Access Code: [email or web address]  URL: [email or web address]  Date: 11/08/2023  Prepared by: Braden Frederick    Exercises  - Supine Piriformis Stretch with Foot on Ground  - 2 x daily - 7 x weekly - 1 sets - 3 reps - 30sec hold  - Supine Posterior Pelvic Tilt  - 1 x daily - 7 x weekly - 2 sets - 10 reps  - Supine Pelvic Tilt with Straight Leg Raise  - 1 x daily - 7 x weekly - 2 sets - 10 reps  - Supine March with Alternating Leg Lifts  - 1 x daily - 7 x weekly - 2 sets - 10 reps

## 2023-11-08 NOTE — LETTER
November 9, 2023     Patient: Tee Laughlin   YOB: 1980   Date of Visit: 11/8/2023       To Whom it May Concern:    Tee Laughlin was seen in my clinic on 11/8/2023. She {Return to school/sport:38332}.    If you have any questions or concerns, please don't hesitate to call.         Sincerely,          Braden Frederick, PT        CC: No Recipients

## 2023-11-15 PROBLEM — M54.41 CHRONIC RIGHT-SIDED LOW BACK PAIN WITH RIGHT-SIDED SCIATICA: Status: ACTIVE | Noted: 2023-11-15

## 2023-11-15 PROBLEM — G89.29 CHRONIC RIGHT-SIDED LOW BACK PAIN WITH RIGHT-SIDED SCIATICA: Status: ACTIVE | Noted: 2023-11-15

## 2023-11-20 ENCOUNTER — TREATMENT (OUTPATIENT)
Dept: PHYSICAL THERAPY | Facility: CLINIC | Age: 43
End: 2023-11-20
Payer: COMMERCIAL

## 2023-11-20 DIAGNOSIS — G89.29 CHRONIC RIGHT-SIDED LOW BACK PAIN WITH RIGHT-SIDED SCIATICA: Primary | ICD-10-CM

## 2023-11-20 DIAGNOSIS — M54.41 CHRONIC RIGHT-SIDED LOW BACK PAIN WITH RIGHT-SIDED SCIATICA: Primary | ICD-10-CM

## 2023-11-20 PROCEDURE — 97110 THERAPEUTIC EXERCISES: CPT | Mod: GP | Performed by: PHYSICAL THERAPIST

## 2023-11-20 ASSESSMENT — PAIN SCALES - GENERAL: PAINLEVEL_OUTOF10: 3

## 2023-11-20 ASSESSMENT — PAIN - FUNCTIONAL ASSESSMENT: PAIN_FUNCTIONAL_ASSESSMENT: 0-10

## 2023-11-20 NOTE — PROGRESS NOTES
Physical Therapy    Physical Therapy Treatment    Patient Name: Tee Laughlin  MRN: [id number]  Today's Date: 11/20/2023  Time Calculation  Start Time: 1016  Stop Time: 1103  Time Calculation (min): 47 min  [facility]  10% coinsurance, $40 copay   ANITA HANEY not required  Visit: 3  POC: 10    Assessment:  Treatment session progresses therapeutic exercises targeting increasing strength of the low back, abdominals, and bilateral lower extremities this date. Patient is able to complete all new additions fully, but reports intermittent increase in pain in the low back throughout treatment. Education provided this date regarding pain rules, and anatomy/physiology of the low back. HEP updated for additional progression outside of PT sessions. She remains limited due to her current impairments, and would benefit form further skilled PT.   PT Assessment  PT Assessment Results: Decreased strength, Decreased range of motion, Pain  Rehab Prognosis: Good    Plan:  OP PT Plan  PT Plan: Skilled PT  Rehab Potential: Good  Plan of Care Agreement: Patient    Current Problem  1. Chronic right-sided low back pain with right-sided sciatica            General     General  Reason for Referral: low back pain and right lower extremity pain  Referred By: Dr. Dontrell Blue    Subjective    Patient reports that pain in the lumbar spine continues, and it fluctuates on a day to day basis. She notes she is not getting as much soreness as she originally imagined she would while doing the core stabilization exercises.   Precautions  Precautions  STEADI Fall Risk Score (The score of 4 or more indicates an increased risk of falling): 0  Pain  Pain Assessment  Pain Assessment: 0-10  Pain Score: 3  Pain Type: Chronic pain  Pain Location: Back  Pain Orientation: Right, Left    Objective   Pt demonstrates tendency to increase lumbar lordosis and hyperextend the B knees during normal weightbearing    Treatments:  Therapeutic exercises:   Supine figure 4  "stretch 3x30\"  PPT reviewed  PPT with march and knee extension x15 ea   Supine russian twist 10# x15 ea   Bridges with RSB 2x10  Paloff press BTT x15 B  Hip hikes off step x15 ea   Seated on swiss ball stir the pot 6#  Lumbar stretch with ball x10 (hold d/t increased pain)  (44')     OP EDUCATION:  Outpatient Education  Individual(s) Educated: Patient  Education Provided: Home Exercise Program  HEP:  Exercises  - Supine Figure 4 Piriformis Stretch  - 2 x daily - 7 x weekly - 1 sets - 3 reps - 30sec hold  - Standing Anti-Rotation Press with Anchored Resistance  - 1 x daily - 7 x weekly - 1 sets - 15 reps - 2-3 sec hold  Goals:  By discharge:  1. Patient will report and demonstrate independence with established HEP  2. Patient will demonstrate ability to bend and lift 10lb from the floor to waist height 5x consecutively without an increase in low back pain  3. Patient will increase gross hip and knee strength to >/= 4+/5 to improve their ability to stand, ambulate, lift, and perform all work duties without restrictions  4. Patent will report improvement in low back and right lower extremity pain by 75% since the start of therapy to allow for an increased ability to perform ADLs and work duties  5. Patient will demonstrate a decrease in Modified Oswestry Low Back Disability Index score by 10 pts to 15/50 to meet established MCID for the outcome measure (baseline 11/8/23 25/50)  6. Patient will demonstrate the ability to perform a 25# box lift and carry 50' x2 without pain in the lumbar spine exceeding 2/10 to improve her ability to perform light-moderate activities within the home  7. Patient will report the ability to sleep through the night 4/7 nights per week uninterrupted by pain to improve overall function throughout the day  8. Patient will report the ability to return to recreation activities including working out at the gym without pain in the low back or right lower extremity exceeding 2/10 to allow for " improved overall fitness level   9. Patient will demonstrate the ability to perform a static 10lb lift and twist transfer from side to side 10x consecutively without an increase in low back pain

## 2023-11-20 NOTE — PATIENT INSTRUCTIONS
Exercises  - Supine Figure 4 Piriformis Stretch  - 2 x daily - 7 x weekly - 1 sets - 3 reps - 30sec hold  - Standing Anti-Rotation Press with Anchored Resistance  - 1 x daily - 7 x weekly - 1 sets - 15 reps - 2-3 sec hold

## 2023-11-22 ENCOUNTER — TREATMENT (OUTPATIENT)
Dept: PHYSICAL THERAPY | Facility: CLINIC | Age: 43
End: 2023-11-22
Payer: COMMERCIAL

## 2023-11-22 DIAGNOSIS — G89.29 CHRONIC RIGHT-SIDED LOW BACK PAIN WITH RIGHT-SIDED SCIATICA: Primary | ICD-10-CM

## 2023-11-22 DIAGNOSIS — M54.41 CHRONIC RIGHT-SIDED LOW BACK PAIN WITH RIGHT-SIDED SCIATICA: Primary | ICD-10-CM

## 2023-11-22 PROCEDURE — 97110 THERAPEUTIC EXERCISES: CPT | Mod: GP | Performed by: PHYSICAL THERAPIST

## 2023-11-22 ASSESSMENT — PAIN SCALES - GENERAL: PAINLEVEL_OUTOF10: 4

## 2023-11-22 ASSESSMENT — PAIN - FUNCTIONAL ASSESSMENT: PAIN_FUNCTIONAL_ASSESSMENT: 0-10

## 2023-11-22 NOTE — PATIENT INSTRUCTIONS
Access Code: [email or web address]  URL: [email or web address]  Date: 11/22/2023  Prepared by: Braden Frederick    Exercises  - Hooklying Clamshell with Resistance  - 1 x daily - 7 x weekly - 1 sets - 15 reps - 5 sec hold  - Supine Hip Adduction Isometric with Ball  - 1 x daily - 7 x weekly - 1 sets - 15 reps - 5 sec hold

## 2023-11-22 NOTE — PROGRESS NOTES
"Physical Therapy    Physical Therapy Treatment    Patient Name: Tee Laughlin  MRN: [id number]  Today's Date: 11/22/2023  Time Calculation  Start Time: 1355  Stop Time: 1430  Time Calculation (min): 35 min  [facility]  10% coinsurance, $40 copay   ANITA HANEY not required  Visit: 4  POC: 10    Assessment:  Treatment session shortened secondary to patient arriving to session 10 minutes late. Therapeutic exercises progressed with new additions targeting gross hip and low back strengthening. Patient is challenged with new additions, and verbal/tactile cues required to complete fully and without compensation. HEP updated for additional progression outside of PT sessions. Her current impairments place her at a decreased functional level and she would benefit from additional skilled PT at this time.   PT Assessment  PT Assessment Results: Decreased strength, Decreased range of motion, Pain  Rehab Prognosis: Good    Plan:  OP PT Plan  PT Plan: Skilled PT  Rehab Potential: Good  Plan of Care Agreement: Patient    Current Problem  1. Chronic right-sided low back pain with right-sided sciatica              General     General  Reason for Referral: low back pain and right lower extremity pain  Referred By: Dr. Dontrell Blue    Subjective    Patient reports that after last session she felt fine, but slightly sore. However yesterday she was sitting a lot at work and pain increased after sitting for too long, pain was a 7/10. Pain has decreased today to a 4/10.   Precautions  Precautions  STEADI Fall Risk Score (The score of 4 or more indicates an increased risk of falling): 0  Pain  Pain Assessment  Pain Assessment: 0-10  Pain Score: 4  Pain Type: Chronic pain  Pain Location: Back    Objective   Pt arrives to appointment 10 minutes late    R anteriorly rotated illium    Treatments:  Therapeutic exercises:   Supine hip abd/add vs blackTB/PB 15x5\" ea   Supine figure 4 stretch 3x30\" NT  Hip flexor stretch NV  PPT reviewed  PPT with " march and knee extension x15 ea NT  Supine russian twist 10# 2x10 ea   Bridges with RSB 2x10  Paloff press BTT x15 B NT  Hip hikes off step x15 ea  Seated on swiss ball stir the pot 6# NT  Lumbar stretch with ball x10 (hold d/t increased pain)  Deadbugs x12  Quad hip extension x10 B  Open book stretch x10 B  RDL 9# x10 B  (35')     OP EDUCATION:  Outpatient Education  Individual(s) Educated: Patient  Education Provided: Home Exercise Program  HEP:  Exercises  - Hooklying Clamshell with Resistance  - 1 x daily - 7 x weekly - 1 sets - 15 reps - 5 sec hold  - Supine Hip Adduction Isometric with Ball  - 1 x daily - 7 x weekly - 1 sets - 15 reps - 5 sec hold  Goals:  By discharge:  1. Patient will report and demonstrate independence with established HEP  2. Patient will demonstrate ability to bend and lift 10lb from the floor to waist height 5x consecutively without an increase in low back pain  3. Patient will increase gross hip and knee strength to >/= 4+/5 to improve their ability to stand, ambulate, lift, and perform all work duties without restrictions  4. Patent will report improvement in low back and right lower extremity pain by 75% since the start of therapy to allow for an increased ability to perform ADLs and work duties  5. Patient will demonstrate a decrease in Modified Oswestry Low Back Disability Index score by 10 pts to 15/50 to meet established MCID for the outcome measure (baseline 11/8/23 25/50)  6. Patient will demonstrate the ability to perform a 25# box lift and carry 50' x2 without pain in the lumbar spine exceeding 2/10 to improve her ability to perform light-moderate activities within the home  7. Patient will report the ability to sleep through the night 4/7 nights per week uninterrupted by pain to improve overall function throughout the day  8. Patient will report the ability to return to recreation activities including working out at the gym without pain in the low back or right lower  extremity exceeding 2/10 to allow for improved overall fitness level   9. Patient will demonstrate the ability to perform a static 10lb lift and twist transfer from side to side 10x consecutively without an increase in low back pain

## 2023-11-27 ENCOUNTER — TREATMENT (OUTPATIENT)
Dept: PHYSICAL THERAPY | Facility: CLINIC | Age: 43
End: 2023-11-27
Payer: COMMERCIAL

## 2023-11-27 DIAGNOSIS — G89.29 CHRONIC RIGHT-SIDED LOW BACK PAIN WITH RIGHT-SIDED SCIATICA: Primary | ICD-10-CM

## 2023-11-27 DIAGNOSIS — M54.41 CHRONIC RIGHT-SIDED LOW BACK PAIN WITH RIGHT-SIDED SCIATICA: Primary | ICD-10-CM

## 2023-11-27 PROCEDURE — 97110 THERAPEUTIC EXERCISES: CPT | Mod: GP | Performed by: PHYSICAL THERAPIST

## 2023-11-27 ASSESSMENT — PAIN SCALES - GENERAL: PAINLEVEL_OUTOF10: 6

## 2023-11-27 ASSESSMENT — PAIN - FUNCTIONAL ASSESSMENT: PAIN_FUNCTIONAL_ASSESSMENT: 0-10

## 2023-11-27 NOTE — PATIENT INSTRUCTIONS
Access Code: [email or web address]  URL: [email or web address]  Date: 11/27/2023  Prepared by: Braden Frederick    Exercises  - Bird Dog  - 1 x daily - 7 x weekly - 2 sets - 10 reps  - Supine Dead Bug with Leg Extension  - 1 x daily - 7 x weekly - 2 sets - 10 reps

## 2023-11-27 NOTE — PROGRESS NOTES
"Physical Therapy    Physical Therapy Treatment    Patient Name: Tee Laughlin  MRN: [id number]  Today's Date: 11/27/2023  Time Calculation  Start Time: 1350  Stop Time: 1430  Time Calculation (min): 40 min  [facility]  10% coinsurance, $40 copay   BMN [place] HANEY not required  Visit: 5  POC: 10    Assessment:  PT session focuses on increasing core and low back strength within patient's pain tolerance. Patient's fitness level places her at a higher functional baseline than average, which allows for more difficult exercises to be performed. However, PT continually reminds the patient to avoid overworking the lumbar spine throughout the day. Patient reports decreased pain following muscle energy correction to the pelvis. She remains below her baseline function at this time, and requires additional skilled PT at this time.   PT Assessment  PT Assessment Results: Decreased strength, Decreased range of motion, Pain  Rehab Prognosis: Good    Plan:  OP PT Plan  PT Plan: Skilled PT  Rehab Potential: Good  Plan of Care Agreement: Patient    Current Problem  1. Chronic right-sided low back pain with right-sided sciatica                General     General  Reason for Referral: low back pain and right lower extremity pain  Referred By: Dr. Dontrell Blue    Subjective    Patient reports that her back is hurting worse this session. She notes that she is starting to experience the increase in pain in both sides of the back since Saturday, rather than just on the right side.   Precautions  Precautions  STEADI Fall Risk Score (The score of 4 or more indicates an increased risk of falling): 0  Pain  Pain Assessment  Pain Assessment: 0-10  Pain Score: 6  Pain Type: Chronic pain  Pain Location: Back    Objective   R anteriorly rotated illium    Verbal and tactile cues required to limit twisting in the lumbar spine    Treatments:  Therapeutic exercises:   Supine hip abd/add vs blackTB/PB 15x5\" ea   Supine figure 4 stretch 3x30\" NT  Hip flexor " stretch NV  PPT with march and knee extension x15 ea  Supine russian twist 10# 2x10 ea NT  Bridges with RSB x10  Paloff press BTT x15 B NT  Hip hikes off step x15 ea NT  Seated on swiss ball stir the pot 6# x15 CW/CCW  Lumbar stretch with ball x10 (hold d/t increased pain)  Deadbugs x12  Quad hip extension x10 B NT  Open book stretch x10 B   Bird dogs x15 ea  RDL 9# x12 B  (39')     OP EDUCATION:  Outpatient Education  Individual(s) Educated: Patient  Education Provided: Home Exercise Program  Exercises  - Bird Dog  - 1 x daily - 7 x weekly - 2 sets - 10 reps  - Supine Dead Bug with Leg Extension  - 1 x daily - 7 x weekly - 2 sets - 10 reps  Goals:  By discharge:  1. Patient will report and demonstrate independence with established HEP  2. Patient will demonstrate ability to bend and lift 10lb from the floor to waist height 5x consecutively without an increase in low back pain  3. Patient will increase gross hip and knee strength to >/= 4+/5 to improve their ability to stand, ambulate, lift, and perform all work duties without restrictions  4. Patent will report improvement in low back and right lower extremity pain by 75% since the start of therapy to allow for an increased ability to perform ADLs and work duties  5. Patient will demonstrate a decrease in Modified Oswestry Low Back Disability Index score by 10 pts to 15/50 to meet established MCID for the outcome measure (baseline 11/8/23 25/50)  6. Patient will demonstrate the ability to perform a 25# box lift and carry 50' x2 without pain in the lumbar spine exceeding 2/10 to improve her ability to perform light-moderate activities within the home  7. Patient will report the ability to sleep through the night 4/7 nights per week uninterrupted by pain to improve overall function throughout the day  8. Patient will report the ability to return to recreation activities including working out at the gym without pain in the low back or right lower extremity exceeding  2/10 to allow for improved overall fitness level   9. Patient will demonstrate the ability to perform a static 10lb lift and twist transfer from side to side 10x consecutively without an increase in low back pain

## 2023-11-29 ENCOUNTER — TREATMENT (OUTPATIENT)
Dept: PHYSICAL THERAPY | Facility: CLINIC | Age: 43
End: 2023-11-29
Payer: COMMERCIAL

## 2023-11-29 DIAGNOSIS — M54.41 CHRONIC RIGHT-SIDED LOW BACK PAIN WITH RIGHT-SIDED SCIATICA: Primary | ICD-10-CM

## 2023-11-29 DIAGNOSIS — G89.29 CHRONIC RIGHT-SIDED LOW BACK PAIN WITH RIGHT-SIDED SCIATICA: Primary | ICD-10-CM

## 2023-11-29 PROCEDURE — 97140 MANUAL THERAPY 1/> REGIONS: CPT | Mod: GP,CQ

## 2023-11-29 PROCEDURE — 97110 THERAPEUTIC EXERCISES: CPT | Mod: GP,CQ

## 2023-11-29 ASSESSMENT — PAIN - FUNCTIONAL ASSESSMENT: PAIN_FUNCTIONAL_ASSESSMENT: 0-10

## 2023-11-29 ASSESSMENT — PAIN SCALES - GENERAL: PAINLEVEL_OUTOF10: 5 - MODERATE PAIN

## 2023-11-29 NOTE — PROGRESS NOTES
"Physical Therapy    Physical Therapy Treatment    Patient Name: Tee Laughlin  MRN: [id number]  Today's Date: 11/29/2023  Time Calculation  Start Time: 0145  Stop Time: 0235  Time Calculation (min): 50 min  [facility]  10% coinsurance, $40 copay   ANITA HANEY not required  Visit: 6  POC: 10    Assessment:  Decreased malalignment/ muscle tightness noted after manual.  Pt instructed to focus on increasing flexibility of B hip flexors and strength of B glutes this next week. Pt reports \"it feels a little looser\" after tx. Pt continues to need reminding to avoid overworking the lumbar spine throughout the day. Patient reports decreased pain following muscle energy correction to the pelvis. She remains below her baseline function at this time, and requires additional skilled PT at this time.        Plan:  OP PT Plan  PT Plan: Skilled PT    Current Problem  1. Chronic right-sided low back pain with right-sided sciatica                Subjective    Patient reports that her back is  about the same as last tx. Notes picking up and swinging cases of pop and it felt better after that. Still notes pain w/ sitting ~ 20+ mins.    Precautions  Precautions  STEADI Fall Risk Score (The score of 4 or more indicates an increased risk of falling): 0  Pain  Pain Assessment  Pain Assessment: 0-10  Pain Score: 5 - Moderate pain    Objective   R anteriorly rotated illium x2    Verbal and tactile cues to decrease compensation of spine.    Treatments:  Therapeutic exercises:   Supine hip abd/add vs blackTB/PB 15x5\" ea   Supine figure 4 stretch 3x30\" NT  Hip flexor stretch NV  PPT with march and knee extension x15 ea  Supine russian twist 10# 2x10 ea NT  Bridges with RSB x10  Paloff press BTT x15 B NT  Hip hikes off step x15 ea NT  Seated on swiss ball stir the pot 6# x15 CW/CCW  NT  Lumbar stretch with ball x10 (hold d/t increased pain) NT  Deadbugs x12   Quad hip extension x10 B NT  Open book stretch x10 B NT  Bird dogs x15 ea  RDL 9# x12 B " "NT  B Hip flexor (runner stretch or off table supine) 3x30\"ea  prone hip ext (knee flex and ext) w/ 2 pillows x10ea (NO BACK EXT)  piriformis stretch off side of bed 3x30\"ea      Manual:  MET to pelvis x2. DTM/ TPR to R glute min/ medius/ B lats/ B hip flexors.    OP EDUCATION:   Exercises  - Bird Dog  - 1 x daily - 7 x weekly - 2 sets - 10 reps  - Supine Dead Bug with Leg Extension  - 1 x daily - 7 x weekly - 2 sets - 10 reps    11/29/23:  B Hip flexor (runner stretch or off table supine), prone hip ext (knee flex and ext) w/ 2 pillows, piriformis stretch off side of bed.    Goals:  By discharge:  1. Patient will report and demonstrate independence with established HEP  2. Patient will demonstrate ability to bend and lift 10lb from the floor to waist height 5x consecutively without an increase in low back pain  3. Patient will increase gross hip and knee strength to >/= 4+/5 to improve their ability to stand, ambulate, lift, and perform all work duties without restrictions  4. Patent will report improvement in low back and right lower extremity pain by 75% since the start of therapy to allow for an increased ability to perform ADLs and work duties  5. Patient will demonstrate a decrease in Modified Oswestry Low Back Disability Index score by 10 pts to 15/50 to meet established MCID for the outcome measure (baseline 11/8/23 25/50)  6. Patient will demonstrate the ability to perform a 25# box lift and carry 50' x2 without pain in the lumbar spine exceeding 2/10 to improve her ability to perform light-moderate activities within the home  7. Patient will report the ability to sleep through the night 4/7 nights per week uninterrupted by pain to improve overall function throughout the day  8. Patient will report the ability to return to recreation activities including working out at the gym without pain in the low back or right lower extremity exceeding 2/10 to allow for improved overall fitness level   9. Patient will " demonstrate the ability to perform a static 10lb lift and twist transfer from side to side 10x consecutively without an increase in low back pain

## 2023-12-04 ENCOUNTER — TELEPHONE (OUTPATIENT)
Dept: PRIMARY CARE | Facility: CLINIC | Age: 43
End: 2023-12-04

## 2023-12-04 ENCOUNTER — TREATMENT (OUTPATIENT)
Dept: PHYSICAL THERAPY | Facility: CLINIC | Age: 43
End: 2023-12-04
Payer: COMMERCIAL

## 2023-12-04 DIAGNOSIS — G89.29 CHRONIC RIGHT-SIDED LOW BACK PAIN WITH RIGHT-SIDED SCIATICA: Primary | ICD-10-CM

## 2023-12-04 DIAGNOSIS — M54.41 CHRONIC RIGHT-SIDED LOW BACK PAIN WITH RIGHT-SIDED SCIATICA: Primary | ICD-10-CM

## 2023-12-04 PROCEDURE — 97110 THERAPEUTIC EXERCISES: CPT | Mod: GP | Performed by: PHYSICAL THERAPIST

## 2023-12-04 ASSESSMENT — PAIN SCALES - GENERAL: PAINLEVEL_OUTOF10: 4

## 2023-12-04 ASSESSMENT — PAIN - FUNCTIONAL ASSESSMENT: PAIN_FUNCTIONAL_ASSESSMENT: 0-10

## 2023-12-04 NOTE — PROGRESS NOTES
"Physical Therapy    Physical Therapy Treatment    Patient Name: Tee Laughlin  MRN: [id number]  Today's Date: 12/4/2023  Time Calculation  Start Time: 1348  Stop Time: 1430  Time Calculation (min): 42 min  [facility]  10% coinsurance, $40 copay   ANITA HANEY not required  Visit: 6  POC: 10    Assessment:  Treatment session focuses on therapeutic exercises targeting gross strengthening of the low back and lower extremities. Patient reports mild increase in pain in the right sided QL/proximal gluteals throughout treatment. Increased difficulty with activation of the right quadratus lumborum. HEP updated this date for additional progression outside of PT sessions. She remains below her baseline function at this time, and PT recommends that she follow up with referring physician as PT treatment continues.   PT Assessment  PT Assessment Results: Decreased strength, Decreased range of motion, Pain  Evaluation/Treatment Tolerance: Patient tolerated treatment well    Plan:  OP PT Plan  PT Plan: Skilled PT    Current Problem  1. Chronic right-sided low back pain with right-sided sciatica               General  Reason for Referral: low back pain and right lower extremity pain  Referred By: Dr. Dontrell Blue  Subjective    Patient reports that she feels the same, and the back pain remains high when she is sitting for extended periods of time, and performing lifting tasks at home. She notes that she hasn't contacted the doctor yet.     Precautions  Precautions  STEADI Fall Risk Score (The score of 4 or more indicates an increased risk of falling): 0  Pain  Pain Assessment  Pain Assessment: 0-10  Pain Score: 4  Pain Location: Back    Objective   Verbal cues to decrease trunk rotation during bird dogs    Treatments:  Therapeutic exercises  Supine hip abd/add vs blackTB/PB 15x5\" ea   Supine figure 4 stretch 3x30\" NT  Hip flexor stretch NV  PPT with march and knee extension x15 ea NT  Supine russian twist 10# 2x10 ea NT  Bridges with " "RSB x10 NT  Paloff press BTT x15 B NT  Hip hikes off step x15 ea NT  Clamshells L2 x15  Seated on swiss ball stir the pot 6# x15 CW/CCW  NT  Lumbar stretch with ball x10 (hold d/t increased pain) NT  Deadbugs x12 NT  Quad hip extension x10 B NT  Open book stretch x10 B NT  Bird dogs x12 ea  RDL 9# x12 B NT  B Hip flexor (runner stretch or off table supine) 3x30\"ea NT  prone hip ext (knee flex and ext) w/ 2 pillows x10ea (NO BACK EXT)  piriformis stretch off side of bed 3x30\"ea  Includes LAD to L hip 2x30  Bridge with single leg raise x15 B  Quad QL stretch 10x10\"  Quad hip hike x10 B  (39')    Goals:  By discharge:  1. Patient will report and demonstrate independence with established HEP  2. Patient will demonstrate ability to bend and lift 10lb from the floor to waist height 5x consecutively without an increase in low back pain  3. Patient will increase gross hip and knee strength to >/= 4+/5 to improve their ability to stand, ambulate, lift, and perform all work duties without restrictions  4. Patent will report improvement in low back and right lower extremity pain by 75% since the start of therapy to allow for an increased ability to perform ADLs and work duties  5. Patient will demonstrate a decrease in Modified Oswestry Low Back Disability Index score by 10 pts to 15/50 to meet established MCID for the outcome measure (baseline 11/8/23 25/50)  6. Patient will demonstrate the ability to perform a 25# box lift and carry 50' x2 without pain in the lumbar spine exceeding 2/10 to improve her ability to perform light-moderate activities within the home  7. Patient will report the ability to sleep through the night 4/7 nights per week uninterrupted by pain to improve overall function throughout the day  8. Patient will report the ability to return to recreation activities including working out at the gym without pain in the low back or right lower extremity exceeding 2/10 to allow for improved overall fitness level "   9. Patient will demonstrate the ability to perform a static 10lb lift and twist transfer from side to side 10x consecutively without an increase in low back pain

## 2023-12-04 NOTE — TELEPHONE ENCOUNTER
----- Message from Tee Laughlin sent at 12/4/2023  2:56 PM EST -----  Regarding: Lower back pain - right side  Contact: [phone]  On license of [facility] Dr. Blue,  I have been going to Physical Therapy for a while and stretching and I still have pain in my back daily. While the stretching has helped the acute pain and allowed me to move more I still have daily back pain. The back pain is waking me up at night as well.  I stopped taking the muscle relaxer the week of Thanksgiving. I’m not interested in staying on the muscle relaxer long term.  Please let me know what the next steps are for care. Orthopedics, visit with you, other.  Thank you,  Tee Laughlin

## 2023-12-06 ENCOUNTER — OFFICE VISIT (OUTPATIENT)
Dept: PRIMARY CARE | Facility: CLINIC | Age: 43
End: 2023-12-06
Payer: COMMERCIAL

## 2023-12-06 ENCOUNTER — TREATMENT (OUTPATIENT)
Dept: PHYSICAL THERAPY | Facility: CLINIC | Age: 43
End: 2023-12-06
Payer: COMMERCIAL

## 2023-12-06 VITALS
TEMPERATURE: 97.1 F | SYSTOLIC BLOOD PRESSURE: 100 MMHG | BODY MASS INDEX: 23.45 KG/M2 | DIASTOLIC BLOOD PRESSURE: 60 MMHG | HEART RATE: 80 BPM | HEIGHT: 61 IN | RESPIRATION RATE: 18 BRPM | WEIGHT: 124.2 LBS | OXYGEN SATURATION: 98 %

## 2023-12-06 DIAGNOSIS — G89.29 CHRONIC RIGHT-SIDED LOW BACK PAIN WITH RIGHT-SIDED SCIATICA: Primary | ICD-10-CM

## 2023-12-06 DIAGNOSIS — G40.909 SEIZURE DISORDER (MULTI): ICD-10-CM

## 2023-12-06 DIAGNOSIS — M54.41 CHRONIC RIGHT-SIDED LOW BACK PAIN WITH RIGHT-SIDED SCIATICA: Primary | ICD-10-CM

## 2023-12-06 PROCEDURE — 99213 OFFICE O/P EST LOW 20 MIN: CPT | Performed by: FAMILY MEDICINE

## 2023-12-06 PROCEDURE — 1036F TOBACCO NON-USER: CPT | Performed by: FAMILY MEDICINE

## 2023-12-06 PROCEDURE — 97110 THERAPEUTIC EXERCISES: CPT | Mod: GP | Performed by: PHYSICAL THERAPIST

## 2023-12-06 RX ORDER — CELECOXIB 200 MG/1
200 CAPSULE ORAL 2 TIMES DAILY
Qty: 60 CAPSULE | Refills: 3 | Status: SHIPPED | OUTPATIENT
Start: 2023-12-06 | End: 2024-06-03

## 2023-12-06 ASSESSMENT — PAIN SCALES - GENERAL: PAINLEVEL_OUTOF10: 3

## 2023-12-06 ASSESSMENT — PAIN - FUNCTIONAL ASSESSMENT: PAIN_FUNCTIONAL_ASSESSMENT: 0-10

## 2023-12-06 NOTE — PROGRESS NOTES
"Subjective   Patient ID: Tee Laughlin is a 43 y.o. female who presents for Back Pain.  HPI    Patient presents in office today for back pain. Ongoing for a while. Patient admits that she has been going to physical therapy. Admits that she still has daily back pain. Admits that she did stop the muscle relaxer the week of thaksgiving due to not wanting to take this long term. Patient would like to know what other options she has. Pain has been 5/10 consistently. OTC Magnesium.  Feels physical therapy has helped some and the pain overall is better.  Does not have a going down her leg anymore.    Taking current medications which were reviewed.  Problem list discussed.    Eating okay.  Staying active.    Has no other new problem /question.     ROS  Constitutional- No activity change. No appetite change.  Eyes- Denies vision changes.  Respiratory- No shortness of breath.  Cardiovascular- No palpitations. No chest pain.  GI- No nausea or vomiting. No diarrhea or constipation. Denies abdominal pain.  Musculoskeletal- myalgias   Extremities- No edema.  Neurological- Denies headaches. Denies dizziness.  Skin- No rashes.  Psychiatric/Behavioral- Denies significant anxiety, or depressed mood.     Objective     /60   Pulse 80   Temp 36.2 °C (97.1 °F) (Temporal)   Resp 18   Ht 1.549 m (5' 1\")   Wt 56.3 kg (124 lb 3.2 oz)   SpO2 98%   BMI 23.47 kg/m²     No Known Allergies    Constitutional-- Well-nourished.  No distress  Neck- Supple with no thyromegaly.  No significant cervical adenopathy noted.  Pulmonary/Chest- Breath sounds normal with normal effort.  No wheezing.  Heart- Regular rate and rhythm.  No murmur.  Abdomen- Soft and non-tender.  No masses noted.  Musculoskeletal-mild low back pain mostly on the right side localizing to the SI joint area.  Mild lower lumbar pain.  X-ray reviewed with her.  Extremities- No edema.   Neurological- Alert.  No noted deficits.  Skin- Warm.   Psychiatric/Behavioral- Mood " and affect normal.      Assessment/Plan   1. Chronic right-sided low back pain with right-sided sciatica  celecoxib (CeleBREX) 200 mg capsule      2. Seizure disorder (CMS/HCC)               Long talk. Treatment options reviewed.    Discussed lower back pain.    Treatment options reviewed.  Will switch to Celebrex to see if this does not make a bigger difference.  Continue physical therapy.  Continue and take your medications as prescribed.    Health Maintenance issues discussed.    Importance of healthy diet and regular exercise regimen discussed.    Further workup and possible orthopedic referral depending on how she responds  Follow-up as instructed or sooner if any problems or symptoms do not resolve as expected.            Scribe Attestation  By signing my name below, IDebi Scribe   attest that this documentation has been prepared under the direction and in the presence of Dontrell Blue MD.

## 2023-12-06 NOTE — PROGRESS NOTES
Physical Therapy    Physical Therapy Treatment    Patient Name: Tee Laughlin  MRN: [id number]  Today's Date: 12/6/2023  Time Calculation  Start Time: 1348  Stop Time: 1430  Time Calculation (min): 42 min  [facility]  10% coinsurance, $40 copay   ANITA HANEY not required  Visit: 7  POC: 10    Assessment:  Treatment session focuses on therapeutic exercises targeting improving strength and stability of the low back, bilateral hips, and core. Patient reports intermittent pain throughout the session in the bilateral low back, right proximal glutes, and right lateral hip. Despite increased pain, patient is able to complete all activities fully. Pain reduces with long axis distraction of the right hip. Pain has improved overall since starting PT, and patient is now able to complete more activities with less restriction. However, pain fluctuates greatly, and lasting pain relief has not been achieve in the lumbar spine. PT to consider DN treatment in the future for possible pain relief. Patient is set to follow up with referring physician later today and PT will adjust POC as needed.   PT Assessment  PT Assessment Results: Decreased strength, Decreased range of motion, Pain    Plan:  OP PT Plan  PT Plan: Skilled PT    Current Problem  1. Chronic right-sided low back pain with right-sided sciatica                 General  Reason for Referral: low back pain and right lower extremity pain  Referred By: Dr. Dontrell Blue  Subjective    Patient reports that she had a good amount of pain at the end of the day last night right across her back, but she was sitting a lot at work. She notes that after last visit she had to put some ice on it.     Precautions  Precautions  STEADI Fall Risk Score (The score of 4 or more indicates an increased risk of falling): 0  Pain  Pain Assessment  Pain Assessment: 0-10  Pain Score: 3  Pain Location: Back    Objective   Decreased pain with LAD of the lumbar spine    Tactile cues to decrease lumbar  "rotation with quadruped hip hikes    Treatments:  Therapeutic exercises:  Upright bike discontinued after 4' d/t pain  Supine hip abd/add vs blackTB/PB 15x5\" ea NT  Supine figure 4 stretch 3x30\" NT  Hip flexor stretch NV  PPT with march and knee extension x15 ea NT  Supine russian twist 10# 2x10 ea  Bridges with RSB x10 NT  Paloff press BTT x15 B NT  Hip hikes off step x15 ea NT  Clamshells L2 x15 B  Seated on swiss ball stir the pot 6# x15 CW/CCW  NT  Lumbar stretch with ball x10 (hold d/t increased pain) NT  Deadbugs x12 NT  Quad hip extension x10 B NT  Open book stretch x10 B NT  Bird dogs x12 ea  RDL 9# x12 B NT  B Hip flexor (runner stretch or off table supine) 3x30\"ea NT  prone hip ext (knee flex and ext) w/ 2 pillows x10ea (NO BACK EXT) NT  piriformis stretch off side of bed 3x30\"ea NT  Includes LAD to L hip 2x30  Bridge with single leg raise x15 B  Quad QL stretch 10x10\"  Quad hip hike x10 B  Reverse crunch 2x10  (39')    Goals:  By discharge:  1. Patient will report and demonstrate independence with established HEP  2. Patient will demonstrate ability to bend and lift 10lb from the floor to waist height 5x consecutively without an increase in low back pain  3. Patient will increase gross hip and knee strength to >/= 4+/5 to improve their ability to stand, ambulate, lift, and perform all work duties without restrictions  4. Patent will report improvement in low back and right lower extremity pain by 75% since the start of therapy to allow for an increased ability to perform ADLs and work duties  5. Patient will demonstrate a decrease in Modified Oswestry Low Back Disability Index score by 10 pts to 15/50 to meet established MCID for the outcome measure (baseline 11/8/23 25/50)  6. Patient will demonstrate the ability to perform a 25# box lift and carry 50' x2 without pain in the lumbar spine exceeding 2/10 to improve her ability to perform light-moderate activities within the home  7. Patient will report the " ability to sleep through the night 4/7 nights per week uninterrupted by pain to improve overall function throughout the day  8. Patient will report the ability to return to recreation activities including working out at the gym without pain in the low back or right lower extremity exceeding 2/10 to allow for improved overall fitness level   9. Patient will demonstrate the ability to perform a static 10lb lift and twist transfer from side to side 10x consecutively without an increase in low back pain

## 2023-12-06 NOTE — TELEPHONE ENCOUNTER
CAN WE GO AHEAD AND SCHEDULE HER IN ONE OF YOUR OPEN SLOTS OR IS THERE SOMEWHERE IN PARTICULAR YOU WOULD LIKE FOR US TO SCHEDULE?  PLEASE ADVISE.

## 2023-12-13 ENCOUNTER — TREATMENT (OUTPATIENT)
Dept: PHYSICAL THERAPY | Facility: CLINIC | Age: 43
End: 2023-12-13
Payer: COMMERCIAL

## 2023-12-13 ENCOUNTER — TELEPHONE (OUTPATIENT)
Dept: PRIMARY CARE | Facility: CLINIC | Age: 43
End: 2023-12-13

## 2023-12-13 DIAGNOSIS — G89.29 CHRONIC RIGHT-SIDED LOW BACK PAIN WITH RIGHT-SIDED SCIATICA: Primary | ICD-10-CM

## 2023-12-13 DIAGNOSIS — M54.41 CHRONIC RIGHT-SIDED LOW BACK PAIN WITH RIGHT-SIDED SCIATICA: Primary | ICD-10-CM

## 2023-12-13 PROCEDURE — 97140 MANUAL THERAPY 1/> REGIONS: CPT | Mod: GP,CQ

## 2023-12-13 PROCEDURE — 97110 THERAPEUTIC EXERCISES: CPT | Mod: GP,CQ

## 2023-12-13 RX ORDER — TIZANIDINE 4 MG/1
4 TABLET ORAL EVERY 8 HOURS PRN
Qty: 30 TABLET | Refills: 1 | Status: SHIPPED | OUTPATIENT
Start: 2023-12-13 | End: 2024-01-02

## 2023-12-13 ASSESSMENT — PAIN - FUNCTIONAL ASSESSMENT: PAIN_FUNCTIONAL_ASSESSMENT: 0-10

## 2023-12-13 ASSESSMENT — PAIN SCALES - GENERAL: PAINLEVEL_OUTOF10: 7

## 2023-12-13 NOTE — TELEPHONE ENCOUNTER
Patient call in--stated she have been taking celecoxib 200 mg twice a day. Patient stated it has not been working. Patient stated pain have increase this week.      Please advised

## 2023-12-13 NOTE — PROGRESS NOTES
"Physical Therapy    Physical Therapy Treatment    Patient Name: Tee Laughlin  MRN: [id number]  Today's Date: 12/13/2023  Time Calculation  Start Time: 0150  Stop Time: 0235  Time Calculation (min): 45 min  [facility] [place]  10% coinsurance, $40 copay   BMN [place] HANEY not required  Visit: 8  POC: 10    Assessment:  Decreased malalignment/ muscle tightness noted after manual.  Pt reports \"it feels much better\" after tx. Patient would benefit from P.T. to continue to address impairments in order to improve strength, flexibility, posture, and  to decrease symptoms and increase overall function.    PT Assessment  Evaluation/Treatment Tolerance: Patient tolerated treatment well, Patient limited by pain    Plan:  Monitor alignment.  OP PT Plan  PT Plan: Skilled PT  Current Problem  1. Chronic right-sided low back pain with right-sided sciatica             General  Reason for Referral: low back pain and right lower extremity pain  Referred By: Dr. Dontrell Blue  Subjective    Patient reports 7/10 LBP/ hip pain today for no known reason. Notes sxs in R mid hamstring again.    Precautions  Precautions  STEADI Fall Risk Score (The score of 4 or more indicates an increased risk of falling): 0  Pain  Pain Assessment  Pain Assessment: 0-10  Pain Score: 7  Pain Location: Back    Objective   R long-short.  T10-L5 ERSl.  R T12-L5 FRSR    B paraspinals very tight.    Treatments:  Therapeutic exercises:  PPT 5\"x10  DK2C 3x30\"  Foam Roll glutes/ paraspinals x7'  Siting pt ed    Manual:  MET to:  R long-short.  T10-L5 ERSl.  R T12-L5 FRSR    Sacral torsion jt mob in to ext. Sacral stretch in to ext (gr4).     DTM/ TPR to B glute min/ medius and hip flexors.    R hip inf/post jt mobs (gr4).      Goals:  By discharge:  1. Patient will report and demonstrate independence with established HEP  2. Patient will demonstrate ability to bend and lift 10lb from the floor to waist height 5x consecutively without an increase in low back pain  3. " Patient will increase gross hip and knee strength to >/= 4+/5 to improve their ability to stand, ambulate, lift, and perform all work duties without restrictions  4. Patent will report improvement in low back and right lower extremity pain by 75% since the start of therapy to allow for an increased ability to perform ADLs and work duties  5. Patient will demonstrate a decrease in Modified Oswestry Low Back Disability Index score by 10 pts to 15/50 to meet established MCID for the outcome measure (baseline 11/8/23 25/50)  6. Patient will demonstrate the ability to perform a 25# box lift and carry 50' x2 without pain in the lumbar spine exceeding 2/10 to improve her ability to perform light-moderate activities within the home  7. Patient will report the ability to sleep through the night 4/7 nights per week uninterrupted by pain to improve overall function throughout the day  8. Patient will report the ability to return to recreation activities including working out at the gym without pain in the low back or right lower extremity exceeding 2/10 to allow for improved overall fitness level   9. Patient will demonstrate the ability to perform a static 10lb lift and twist transfer from side to side 10x consecutively without an increase in low back pain

## 2023-12-14 NOTE — PROGRESS NOTES
Subjective   Patient ID: Tee Laughlin is a 43 y.o. female who presents for Mass.  HPI  Dr. Blue patient presents today complaining of a lump left axilla. Noticed it 5 days ago. This is unchanged. No pain, redness, drainage, or itchiness. States it feels hard.       Has no other new problem /question.    Review of Systems  Constitutional:  no chills, no fever and no night sweats.  Eyes: no blurred vision and no eyesight problems.  ENT: no hearing loss, no nasal congestion, no hoarseness and no sore throat.  Neck: no mass (es) and no swelling.  Cardiovascular: no chest pain, no intermittent leg claudication, no lower extremity edema, no palpitation and no syncope.  Respiratory: no cough, no shortness of breath during exertion, no shortness of breath at rest and no wheezing.  Gastrointestinal: no abdominal pain, no blood in stools, no constipation, no diarrhea, no melena, no nausea, no rectal pain and no vomiting.  Genitourinary: no dysuria, no change in urinary frequency, no urinary hesitancy and no feelings of urinary urgency.  Musculoskeletal: no arthralgias, no back pain and no myalgias.  Integumentary: no new skin lesions and no rashes.  Neurological: no difficulty walking, no headache, no limb weakness, no numbness and no tingling.  Psychiatric/Behavioral: no anxiety, no depression, no anhedonia and no substance use disorders.  Endocrine: no recent weight gain and no recent weight loss.  Hematologic/Lymphatic: no tendency for easy bruising and no swollen glands    Objective   Physical Exam  Patient in with complaints of a lump on the left arm noticed that a week or so ago nontender no trauma exam shows approximately 3 x 2 cm soft slightly mobile nontender mass outside the axilla lateral wall of the left arm.  Consistent with lipoma this is out of the axilla has nothing to do with association with the breast.  Long discussion with her concerning what I believe is to be she is anxious she will talk this over  "with her  if she decides that she wants it done for definitive diagnosis she will follow-up with her PCP and will refer to general surgery.  Pulse 73   Temp 36.7 °C (98 °F)   Resp 12   Ht 1.549 m (5' 1\")   Wt 56.2 kg (124 lb)   SpO2 96%   BMI 23.43 kg/m²     Lab Results   Component Value Date    WBC 4.9 12/07/2022    HGB 14.0 12/07/2022    HCT 40.6 12/07/2022    MCV 94 12/07/2022     12/07/2022       Assessment/Plan   Problem List Items Addressed This Visit    None      "

## 2023-12-15 ENCOUNTER — OFFICE VISIT (OUTPATIENT)
Dept: PRIMARY CARE | Facility: CLINIC | Age: 43
End: 2023-12-15
Payer: COMMERCIAL

## 2023-12-15 VITALS
TEMPERATURE: 98 F | OXYGEN SATURATION: 96 % | HEIGHT: 61 IN | SYSTOLIC BLOOD PRESSURE: 92 MMHG | BODY MASS INDEX: 23.41 KG/M2 | WEIGHT: 124 LBS | RESPIRATION RATE: 12 BRPM | HEART RATE: 73 BPM | DIASTOLIC BLOOD PRESSURE: 60 MMHG

## 2023-12-15 DIAGNOSIS — R22.32 MASS OF LEFT AXILLA: Primary | ICD-10-CM

## 2023-12-15 PROCEDURE — 99213 OFFICE O/P EST LOW 20 MIN: CPT | Performed by: FAMILY MEDICINE

## 2023-12-15 PROCEDURE — 1036F TOBACCO NON-USER: CPT | Performed by: FAMILY MEDICINE

## 2023-12-20 ENCOUNTER — TREATMENT (OUTPATIENT)
Dept: PHYSICAL THERAPY | Facility: CLINIC | Age: 43
End: 2023-12-20
Payer: COMMERCIAL

## 2023-12-20 DIAGNOSIS — G89.29 CHRONIC RIGHT-SIDED LOW BACK PAIN WITH RIGHT-SIDED SCIATICA: Primary | ICD-10-CM

## 2023-12-20 DIAGNOSIS — M54.41 CHRONIC RIGHT-SIDED LOW BACK PAIN WITH RIGHT-SIDED SCIATICA: Primary | ICD-10-CM

## 2023-12-20 PROCEDURE — 97110 THERAPEUTIC EXERCISES: CPT | Mod: GP | Performed by: PHYSICAL THERAPIST

## 2023-12-20 ASSESSMENT — PAIN - FUNCTIONAL ASSESSMENT: PAIN_FUNCTIONAL_ASSESSMENT: 0-10

## 2023-12-20 ASSESSMENT — PAIN SCALES - GENERAL: PAINLEVEL_OUTOF10: 4

## 2023-12-20 NOTE — PROGRESS NOTES
"Physical Therapy    Physical Therapy Treatment    Patient Name: Tee Laughlin  MRN: [id number]  Today's Date: 12/20/2023  Time Calculation  Start Time: 1348  Stop Time: 1430  Time Calculation (min): 42 min  [facility]  10% coinsurance, $40 copay   ANITA HANEY not required  Visit: 9  POC: 10    Assessment:  Patient tolerates all treatment well with only mild increase in discomfort in the lumbar spine during therapeutic exercises. Exercises target improving strength and stability of the low back while increasing awareness of mechanics and low back positioning. HEP updated for additional progression outside of PT sessions. Patient reports mild decrease in pain following treatmetn session. She remains well below her baseline function at this time, and requires additional skilled PT focusing on pain management and improving strength/stability of the lumbar spine.   PT Assessment  Evaluation/Treatment Tolerance: Patient limited by pain, Patient limited by fatigue    Plan:  OP PT Plan  PT Plan: Skilled PT    Current Problem  1. Chronic right-sided low back pain with right-sided sciatica                   General  Reason for Referral: low back pain and right lower extremity pain  Referred By: Dr. Dontrell Blue  Subjective    Patient reports that today is not too bad of a day, and pain is only a 4/10. She was given a new prescription for a muscle relaxer that has helped slightly but makes her \"loopy\".     Precautions  Precautions  STEADI Fall Risk Score (The score of 4 or more indicates an increased risk of falling): 0  Pain  Pain Assessment  Pain Assessment: 0-10  Pain Score: 4  Pain Location: Back    Objective   Level pelvis at start of session    Treatments:  Therapeutic exercises:  Upright bike discontinued after 4' d/t pain  Supine hip abd/add vs blackTB/PB 15x5\" ea NT  Supine figure 4 stretch 3x30\" NT  Hip flexor stretch NV  PPT with march and knee extension x15 ea NT  Supine russian twist 10# 2x10 ea  Bridges with RSB " "x10 NT  Paloff press BTT x15 B NT  Hip hikes off step x15 ea NT  Clamshells L2 x15 B  Lumbar stretch with ball x10 (hold d/t increased pain) NT  Deadbugs x12 NT  Quad hip extension x10 B NT  Open book stretch x10 B NT  Bird dogs x12 ea NT  RDL 9# x12 B NT  B Hip flexor (runner stretch or off table supine) 3x30\"ea NT  prone hip ext (knee flex and ext) w/ 2 pillows x10ea (NO BACK EXT) NT  piriformis stretch off side of bed 3x30\"ea NT  Includes LAD to L hip 2x30 NT  Bridge with single leg raise x15 B  Quad QL stretch 10x10\" NT  Quad hip hike x10 B NT  Reverse crunch 2x10  Seated on swiss ball march and kick out x15 ea  Seated on swiss ball stir the pot 6# x15 CW/CCW    Row with abdominal brace blackTT x15  ALT pulldowns blackTT x15  (40')    Goals:  By discharge:  1. Patient will report and demonstrate independence with established HEP  2. Patient will demonstrate ability to bend and lift 10lb from the floor to waist height 5x consecutively without an increase in low back pain  3. Patient will increase gross hip and knee strength to >/= 4+/5 to improve their ability to stand, ambulate, lift, and perform all work duties without restrictions  4. Patent will report improvement in low back and right lower extremity pain by 75% since the start of therapy to allow for an increased ability to perform ADLs and work duties  5. Patient will demonstrate a decrease in Modified Oswestry Low Back Disability Index score by 10 pts to 15/50 to meet established MCID for the outcome measure (baseline 11/8/23 25/50)  6. Patient will demonstrate the ability to perform a 25# box lift and carry 50' x2 without pain in the lumbar spine exceeding 2/10 to improve her ability to perform light-moderate activities within the home  7. Patient will report the ability to sleep through the night 4/7 nights per week uninterrupted by pain to improve overall function throughout the day  8. Patient will report the ability to return to recreation activities " including working out at the gym without pain in the low back or right lower extremity exceeding 2/10 to allow for improved overall fitness level   9. Patient will demonstrate the ability to perform a static 10lb lift and twist transfer from side to side 10x consecutively without an increase in low back pain

## 2024-01-03 ENCOUNTER — TREATMENT (OUTPATIENT)
Dept: PHYSICAL THERAPY | Facility: CLINIC | Age: 44
End: 2024-01-03
Payer: COMMERCIAL

## 2024-01-03 DIAGNOSIS — M54.41 CHRONIC RIGHT-SIDED LOW BACK PAIN WITH RIGHT-SIDED SCIATICA: Primary | ICD-10-CM

## 2024-01-03 DIAGNOSIS — G89.29 CHRONIC RIGHT-SIDED LOW BACK PAIN WITH RIGHT-SIDED SCIATICA: Primary | ICD-10-CM

## 2024-01-03 PROCEDURE — 97110 THERAPEUTIC EXERCISES: CPT | Mod: GP | Performed by: PHYSICAL THERAPIST

## 2024-01-03 ASSESSMENT — PAIN - FUNCTIONAL ASSESSMENT: PAIN_FUNCTIONAL_ASSESSMENT: 0-10

## 2024-01-03 ASSESSMENT — PAIN SCALES - GENERAL: PAINLEVEL_OUTOF10: 2

## 2024-01-03 NOTE — PROGRESS NOTES
Physical Therapy    Physical Therapy Treatment    Patient Name: Tee Laughlin  MRN: [id number]  Today's Date: 1/4/2024   Time in: 1348  Time out: 1440  Total time: 52 min  [facility]  10% coinsurance, $40 copay   ANITA HANEY not required  Visit: 11  POC: 14    Assessment:  Patient has completed 11 therapy visits up to this point, and have met 2/9 established therapy goals. The patient has progressed well, and has shown improvements in pain intensity, strength, flexibility, and overall functional mobility. At this time, the patient remains below functional baseline with intermittent pain in the low back. These deficits impair the patient's ability to bend, lift, carry objects, and participate in gym activities. They would benefit from continued skilled therapy at this time to continue to promote functional gains and a return to prior level of function. PT will be placed on hold for a maximum of 30 days while the patient continues with HEP. PT instructs patient to contact this office in that time frame if continued skilled PT is required. A total of 4 visits will be added to POC for this date and an additional 3 visits to be used if further skilled PT is required following PT hold.   PT Assessment  PT Assessment Results: Decreased strength, Decreased range of motion, Pain  Rehab Prognosis: Good    Plan:  OP PT Plan  Treatment/Interventions: Aquatic therapy, Cryotherapy, Dry needling, Education/ Instruction, Electrical stimulation, Hot pack, Manual therapy, Neuromuscular re-education, Mechanical traction, Self care/ home management, Taping techniques, Therapeutic activities, Therapeutic exercises, Ultrasound, Vasopneumatic device  PT Plan: Skilled PT  PT Frequency: 1 time per week  Duration: 4 weeks  Onset Date: 11/06/23  Certification Period Start Date: 01/03/24  Certification Period End Date: 04/02/24  Rehab Potential: Good  Plan of Care Agreement: Patient  Current Problem  1. Chronic right-sided low back pain with  "right-sided sciatica                 General  Reason for Referral: low back pain and right lower extremity pain  Referred By: Dr. Dontrell Blue  Subjective    Patient reports that she has been feeling good for the last two weeks since her last appointment. She notes that she hasn't been sitting for as long because she has been off of work. Pain has been a 2-3/10 at this time.  Pain is no longer waking her up at this time. Overall she notes 60% improvement in symptoms.     Precautions  Precautions  STEADI Fall Risk Score (The score of 4 or more indicates an increased risk of falling): 0  Pain  Pain Assessment  Pain Assessment: 0-10  Pain Score: 2  Pain Location: Back    Objective   Lumbar AROM:  Flexion to ankles*  Extension WFL  RSB to lateral joint line  LSB to lateral joint line     LE Strength:  Hip flexion R 4/5, L 4+/5  Hip abd R 4/5, L 4+/5  Hip ext R 4-/5*, L 4+/5  Knee flexion R 4/5, L 5/5  Knee extension R 5/5, L 5/5    ANETA: 18; 36%    Lifting: pt able to hadley and lift 10lb from the floor but increased pain is noted  Box carry: not assessed this date due to increased pain at baseline  Lift and twist: not assessed this date due to increased pain at baseline      Treatments:  Therapeutic exercises:  Obj measures and goals review  Supine hip abd/add vs blackTB/PB 15x5\" ea NT  Supine figure 4 stretch 3x30\" NT  Hip flexor stretch in kneeling 15\" x3 ea   PPT with march and knee extension x15 ea NT  Supine russian twist 10# 2x10 ea   Bridges with RSB x10 NT  Paloff press BTT x15 B NT  Hip hikes off step x15 ea NT  Clamshells L2 x15 B  Lumbar stretch with ball x10 (hold d/t increased pain) NT  Deadbugs x12 NT  Quad hip extension x10 B NT  Open book stretch x10 B NT  Bird dogs x12 ea NT  RDL 9# x12 B NT  B Hip flexor (runner stretch or off table supine) 3x30\"ea NT  prone hip ext (knee flex and ext) w/ 2 pillows x10ea (NO BACK EXT) NT  piriformis stretch off side of bed 3x30\"ea NT  Includes LAD to L hip 2x30 NT  Bridge " "with single leg raise x15 B NT  Quad QL stretch 10x10\" NT  Quad hip hike x10 B NT  Reverse crunch 2x10  Seated on swiss ball march and kick out x15 ea  Seated on swiss ball stir the pot 6# x15 CW/CCW    ALT pulldowns blackTT x15  (40')    CP to Low back x10 minutes following treatment    Goals:  By discharge:  1. Patient will report and demonstrate independence with established HEP ONGOING  2. Patient will demonstrate ability to bend and lift 10lb from the floor to waist height 5x consecutively without an increase in low back pain PARTIALLY MET  3. Patient will increase gross hip and knee strength to >/= 4+/5 to improve their ability to stand, ambulate, lift, and perform all work duties without restrictions MET  4. Patent will report improvement in low back and right lower extremity pain by 75% since the start of therapy to allow for an increased ability to perform ADLs and work duties PARTIALLY MET  5. Patient will demonstrate a decrease in Modified Oswestry Low Back Disability Index score by 10 pts to 15/50 to meet established MCID for the outcome measure (baseline 11/8/23 25/50) PARTIALLY MET  6. Patient will demonstrate the ability to perform a 25# box lift and carry 50' x2 without pain in the lumbar spine exceeding 2/10 to improve her ability to perform light-moderate activities within the home NOT ASSESSED  7. Patient will report the ability to sleep through the night 4/7 nights per week uninterrupted by pain to improve overall function throughout the day MET  8. Patient will report the ability to return to recreation activities including working out at the gym without pain in the low back or right lower extremity exceeding 2/10 to allow for improved overall fitness level PARTIALLY MET  9. Patient will demonstrate the ability to perform a static 10lb lift and twist transfer from side to side 10x consecutively without an increase in low back pain NOT ASSESSED   "

## 2024-01-19 ENCOUNTER — APPOINTMENT (OUTPATIENT)
Dept: OBSTETRICS AND GYNECOLOGY | Facility: CLINIC | Age: 44
End: 2024-01-19
Payer: COMMERCIAL

## 2024-01-23 ENCOUNTER — OFFICE VISIT (OUTPATIENT)
Dept: OBSTETRICS AND GYNECOLOGY | Facility: CLINIC | Age: 44
End: 2024-01-23
Payer: COMMERCIAL

## 2024-01-23 VITALS
BODY MASS INDEX: 22.43 KG/M2 | WEIGHT: 118.8 LBS | DIASTOLIC BLOOD PRESSURE: 60 MMHG | HEIGHT: 61 IN | SYSTOLIC BLOOD PRESSURE: 104 MMHG

## 2024-01-23 DIAGNOSIS — Z01.419 WELL WOMAN EXAM WITH ROUTINE GYNECOLOGICAL EXAM: ICD-10-CM

## 2024-01-23 PROCEDURE — 99396 PREV VISIT EST AGE 40-64: CPT | Performed by: ADVANCED PRACTICE MIDWIFE

## 2024-01-23 PROCEDURE — 1036F TOBACCO NON-USER: CPT | Performed by: ADVANCED PRACTICE MIDWIFE

## 2024-01-23 NOTE — PROGRESS NOTES
"GYNECOLOGY PROGRESS NOTE    CC:  see below. Patient states her menses are still irregular, skips some months, but are lighter now. Patient c/o hot flashes at time, mood changes, and issues sleeping. Patient has been trying OTC medications to help with insomnia and mood and she isn't sure if it is working or not yet. Not concerned too much about her menses. Last pap 1/2023, next pap due 2028. No Hx of abnormal pap tests. Patient's last mammogram 1/16/23 wnl, patient interested in MRI due to dense breast.   Chief Complaint   Patient presents with    Annual Exam     Patient presents for Annual Exam.  Pap: 01/18/2023  Mammo:01/16/2023  B/C:  vasectomy  STI:Declines  C/O:Perimenopausal symptoms.        HPI:  Tee Laughlin is here for a routine GYN examination.  No GYN c/o, no AUB.        Depression screen:  negative      ROS:  GI - no blood in BMs  URO - no hematuria  GYN - no AUB or vaginal discharge  PSYCH - mood OK        PHYSICAL EXAM:  /60 (BP Location: Left arm, Patient Position: Sitting, BP Cuff Size: Large adult)   Ht 1.549 m (5' 1\")   Wt 53.9 kg (118 lb 12.8 oz)   LMP 12/10/2023   BMI 22.45 kg/m²   GEN:  A&O, NAD  ABD:  NT/ND, soft, no palpable masses  URO:  normal urethra, no bladder TTP  GYN:  normal vulva and perineum w/o lesions or ulcers, normal vagina without discharge or lesions, normal cervix without lesions or discharge or CMT, uterus NT/NE, adnexa mobile and NT/NE  BREAST:  no masses or TTP, no skin lesions or nipple discharge, breast tissue is dense, denser areas noted upper outer aspects of both breasts.  PSYCH:  normal affect, non-anxious      IMPRESSION/PLAN:    A: normal exam. Bilateral dense breast tissue noted upper outer aspects of both breasts. Menses irregular still. Mood changes, insomnia, and hot flashes starting to occur but on daily.   Plan: 1. Pap due 2028. 2. Mammogram order placed, will call if she wants MRI. 3. Patient to call if she wants something to help with " hot flashes or mood.    Problem List Items Addressed This Visit    None  Visit Diagnoses       Well woman exam with routine gynecological exam        Relevant Orders    BI mammo bilateral screening             Pap and HPV normal in 2023 no Hx of HGSIL, next due in 2028.   ASCCP pap smear screening guidelines reviewed with the patient.    Mammogram up to date and normal.  Density is dense.  Elects for screening mammograms yearly but may want MRI after counseling on guidelines.        JAZLYN rBaun-EVERT

## 2024-02-26 ENCOUNTER — APPOINTMENT (OUTPATIENT)
Dept: RADIOLOGY | Facility: CLINIC | Age: 44
End: 2024-02-26
Payer: COMMERCIAL

## 2024-02-26 DIAGNOSIS — Z12.31 SCREENING MAMMOGRAM FOR BREAST CANCER: ICD-10-CM

## 2024-02-29 ENCOUNTER — HOSPITAL ENCOUNTER (OUTPATIENT)
Dept: RADIOLOGY | Facility: CLINIC | Age: 44
End: 2024-02-29
Payer: COMMERCIAL

## 2024-02-29 ENCOUNTER — HOSPITAL ENCOUNTER (OUTPATIENT)
Dept: RADIOLOGY | Facility: CLINIC | Age: 44
Discharge: HOME | End: 2024-02-29
Payer: COMMERCIAL

## 2024-02-29 DIAGNOSIS — Z01.419 WELL WOMAN EXAM WITH ROUTINE GYNECOLOGICAL EXAM: ICD-10-CM

## 2024-02-29 DIAGNOSIS — Z12.31 SCREENING MAMMOGRAM FOR BREAST CANCER: ICD-10-CM

## 2024-02-29 PROCEDURE — 77063 BREAST TOMOSYNTHESIS BI: CPT | Performed by: RADIOLOGY

## 2024-02-29 PROCEDURE — 77067 SCR MAMMO BI INCL CAD: CPT

## 2024-02-29 PROCEDURE — 77067 SCR MAMMO BI INCL CAD: CPT | Performed by: RADIOLOGY

## 2024-02-29 NOTE — PROGRESS NOTES
"Subjective   Patient ID: Tee Laughlin is a 43 y.o. female who presents for Annual Exam, Back Pain, and Elbow Pain.  HPI  Patient presents today for a physical. Does not need form filled out. Is not fasting for blood work. Tries to eat a generally healthy diet. Exercises.    Also complaining of lower back pain. This is a chronic issue. Has been seen for this before. Has noticed that it has started flaring up x 2 months.    Complaining of right elbow pain x 2 and a half months. This is waxing, and waning. Denies injury, or swelling. Has tried OTC Ibuprofen, and an arm band with no relief.        Taking current medications which were reviewed.  Problem list discussed.    Overall doing well.  Eating okay.  Staying active.    Has no other new problem /question.    ROS  Constitutional- No activity change. No appetite change.  Eyes- Denies vision changes.  Respiratory- No shortness of breath.  Cardiovascular- No palpitations. No chest pain.  GI- No nausea or vomiting. No diarrhea or constipation. Denies abdominal pain.  Musculoskeletal- Denies joint swelling.  Extremities- No edema.  Neurological- Denies headaches. Denies dizziness.  Skin- No rashes.  Psychiatric/Behavioral- Denies significant anxiety, or depressed mood.     Objective     /76   Pulse 74   Temp 37.1 °C (98.8 °F)   Resp 12   Ht 1.549 m (5' 1\")   Wt 55.1 kg (121 lb 6.4 oz)   SpO2 97%   BMI 22.94 kg/m²     No Known Allergies    Constitutional-- Well-nourished.  No distress  Head- unremarkable.  Eyes- PERRL.  Conjunctiva normal.  Nose- Normal.  No rhinorrhea noted.  Throat- Oropharynx is clear and moist.  Neck- Supple with no thyromegaly.  No significant cervical adenopathy noted.  Pulmonary/Chest- Breath sounds normal with normal effort.  No wheezing.  Heart- Regular rate and rhythm.  No murmur.  Abdomen- Soft and non-tender.  No masses noted.  Musculoskeletal-mild pain biceps tendon insertion area right elbow area consistent with " tendinitis  Extremities- No edema.   Neurological- Alert.  No noted deficits.  Skin- Warm.  No rashes.  Psychiatric/Behavioral- Mood and affect normal.  Behavior normal.     Assessment/Plan   1. Routine general medical examination at a health care facility  CBC and Auto Differential    Comprehensive metabolic panel    Lipid panel      2. Seizure disorder (CMS/HCC)  CBC and Auto Differential    Comprehensive metabolic panel      3. Colon cancer screening  Cologuard® colon cancer screening    Cologuard® colon cancer screening      4. Back pain, unspecified back location, unspecified back pain laterality, unspecified chronicity        5. Elbow pain, unspecified laterality  methylPREDNISolone (Medrol Dospak) 4 mg tablets      6. BMI 22.0-22.9, adult        7. Biceps tendinitis of right upper extremity  methylPREDNISolone (Medrol Dospak) 4 mg tablets             Long talk. Treatment options reviewed.    Reviewed most recent lab work with patient. To remain up to date on routine maintenance and health screening.      Discussed elbow pain.  Take methylprednisolone as discussed.  Educated on tendinitis.  Continue to monitor.      Seizure disorder controlled.  Please complete blood work as discussed.     Continue and take your medications as prescribed.    Health Maintenance issues discussed.    Importance of healthy diet and regular exercise regimen discussed.    We will contact you with any test results ordered. If you do not hear from us, please contact.    Follow-up as instructed or sooner if any problems or symptoms do not resolve as expected.        Scribe Attestation  By signing my name below, Debi BOSCH Scribe   attest that this documentation has been prepared under the direction and in the presence of Dontrell Blue MD.

## 2024-03-04 ENCOUNTER — OFFICE VISIT (OUTPATIENT)
Dept: PRIMARY CARE | Facility: CLINIC | Age: 44
End: 2024-03-04
Payer: COMMERCIAL

## 2024-03-04 VITALS
RESPIRATION RATE: 12 BRPM | BODY MASS INDEX: 22.92 KG/M2 | HEART RATE: 74 BPM | DIASTOLIC BLOOD PRESSURE: 76 MMHG | SYSTOLIC BLOOD PRESSURE: 104 MMHG | WEIGHT: 121.4 LBS | HEIGHT: 61 IN | TEMPERATURE: 98.8 F | OXYGEN SATURATION: 97 %

## 2024-03-04 DIAGNOSIS — G40.909 SEIZURE DISORDER (MULTI): ICD-10-CM

## 2024-03-04 DIAGNOSIS — M25.529 ELBOW PAIN, UNSPECIFIED LATERALITY: ICD-10-CM

## 2024-03-04 DIAGNOSIS — M75.21 BICEPS TENDINITIS OF RIGHT UPPER EXTREMITY: ICD-10-CM

## 2024-03-04 DIAGNOSIS — Z00.00 ROUTINE GENERAL MEDICAL EXAMINATION AT A HEALTH CARE FACILITY: Primary | ICD-10-CM

## 2024-03-04 DIAGNOSIS — M54.9 BACK PAIN, UNSPECIFIED BACK LOCATION, UNSPECIFIED BACK PAIN LATERALITY, UNSPECIFIED CHRONICITY: ICD-10-CM

## 2024-03-04 DIAGNOSIS — Z12.11 COLON CANCER SCREENING: ICD-10-CM

## 2024-03-04 PROCEDURE — 99396 PREV VISIT EST AGE 40-64: CPT | Performed by: FAMILY MEDICINE

## 2024-03-04 PROCEDURE — 1036F TOBACCO NON-USER: CPT | Performed by: FAMILY MEDICINE

## 2024-03-04 PROCEDURE — 3008F BODY MASS INDEX DOCD: CPT | Performed by: FAMILY MEDICINE

## 2024-03-04 RX ORDER — METHYLPREDNISOLONE 4 MG/1
TABLET ORAL
Qty: 21 TABLET | Refills: 1 | Status: SHIPPED | OUTPATIENT
Start: 2024-03-04 | End: 2024-05-14 | Stop reason: WASHOUT

## 2024-03-04 ASSESSMENT — PATIENT HEALTH QUESTIONNAIRE - PHQ9
SUM OF ALL RESPONSES TO PHQ9 QUESTIONS 1 AND 2: 0
2. FEELING DOWN, DEPRESSED OR HOPELESS: NOT AT ALL
1. LITTLE INTEREST OR PLEASURE IN DOING THINGS: NOT AT ALL

## 2024-03-05 ENCOUNTER — LAB (OUTPATIENT)
Dept: LAB | Facility: LAB | Age: 44
End: 2024-03-05
Payer: COMMERCIAL

## 2024-03-05 DIAGNOSIS — Z00.00 ROUTINE GENERAL MEDICAL EXAMINATION AT A HEALTH CARE FACILITY: ICD-10-CM

## 2024-03-05 DIAGNOSIS — G40.909 SEIZURE DISORDER (MULTI): ICD-10-CM

## 2024-03-05 LAB
ALBUMIN SERPL BCP-MCNC: 4.2 G/DL (ref 3.4–5)
ALP SERPL-CCNC: 55 U/L (ref 33–110)
ALT SERPL W P-5'-P-CCNC: 13 U/L (ref 7–45)
ANION GAP SERPL CALC-SCNC: 9 MMOL/L (ref 10–20)
AST SERPL W P-5'-P-CCNC: 17 U/L (ref 9–39)
BASOPHILS # BLD AUTO: 0.05 X10*3/UL (ref 0–0.1)
BASOPHILS NFR BLD AUTO: 1 %
BILIRUB SERPL-MCNC: 0.4 MG/DL (ref 0–1.2)
BUN SERPL-MCNC: 10 MG/DL (ref 6–23)
CALCIUM SERPL-MCNC: 9.1 MG/DL (ref 8.6–10.3)
CHLORIDE SERPL-SCNC: 106 MMOL/L (ref 98–107)
CHOLEST SERPL-MCNC: 171 MG/DL (ref 0–199)
CHOLESTEROL/HDL RATIO: 2.8
CO2 SERPL-SCNC: 29 MMOL/L (ref 21–32)
CREAT SERPL-MCNC: 0.8 MG/DL (ref 0.5–1.05)
EGFRCR SERPLBLD CKD-EPI 2021: >90 ML/MIN/1.73M*2
EOSINOPHIL # BLD AUTO: 0.29 X10*3/UL (ref 0–0.7)
EOSINOPHIL NFR BLD AUTO: 5.7 %
ERYTHROCYTE [DISTWIDTH] IN BLOOD BY AUTOMATED COUNT: 11.1 % (ref 11.5–14.5)
GLUCOSE SERPL-MCNC: 87 MG/DL (ref 74–99)
HCT VFR BLD AUTO: 38.7 % (ref 36–46)
HDLC SERPL-MCNC: 60.3 MG/DL
HGB BLD-MCNC: 13 G/DL (ref 12–16)
IMM GRANULOCYTES # BLD AUTO: 0.01 X10*3/UL (ref 0–0.7)
IMM GRANULOCYTES NFR BLD AUTO: 0.2 % (ref 0–0.9)
LDLC SERPL CALC-MCNC: 86 MG/DL
LYMPHOCYTES # BLD AUTO: 2 X10*3/UL (ref 1.2–4.8)
LYMPHOCYTES NFR BLD AUTO: 39.4 %
MCH RBC QN AUTO: 31.1 PG (ref 26–34)
MCHC RBC AUTO-ENTMCNC: 33.6 G/DL (ref 32–36)
MCV RBC AUTO: 93 FL (ref 80–100)
MONOCYTES # BLD AUTO: 0.58 X10*3/UL (ref 0.1–1)
MONOCYTES NFR BLD AUTO: 11.4 %
NEUTROPHILS # BLD AUTO: 2.15 X10*3/UL (ref 1.2–7.7)
NEUTROPHILS NFR BLD AUTO: 42.3 %
NON HDL CHOLESTEROL: 111 MG/DL (ref 0–149)
NRBC BLD-RTO: 0 /100 WBCS (ref 0–0)
PLATELET # BLD AUTO: 205 X10*3/UL (ref 150–450)
POTASSIUM SERPL-SCNC: 4.3 MMOL/L (ref 3.5–5.3)
PROT SERPL-MCNC: 6.7 G/DL (ref 6.4–8.2)
RBC # BLD AUTO: 4.18 X10*6/UL (ref 4–5.2)
SODIUM SERPL-SCNC: 140 MMOL/L (ref 136–145)
TRIGL SERPL-MCNC: 122 MG/DL (ref 0–149)
VLDL: 24 MG/DL (ref 0–40)
WBC # BLD AUTO: 5.1 X10*3/UL (ref 4.4–11.3)

## 2024-03-05 PROCEDURE — 80053 COMPREHEN METABOLIC PANEL: CPT

## 2024-03-05 PROCEDURE — 36415 COLL VENOUS BLD VENIPUNCTURE: CPT

## 2024-03-05 PROCEDURE — 80061 LIPID PANEL: CPT

## 2024-03-05 PROCEDURE — 85025 COMPLETE CBC W/AUTO DIFF WBC: CPT

## 2024-03-06 ENCOUNTER — TELEPHONE (OUTPATIENT)
Dept: PRIMARY CARE | Facility: CLINIC | Age: 44
End: 2024-03-06
Payer: COMMERCIAL

## 2024-03-06 NOTE — TELEPHONE ENCOUNTER
----- Message from Dontrell Blue MD sent at 3/5/2024 10:06 PM EST -----  Labs are within normal limits or stable.  Continue healthy diet and follow-up per routine.  Please let them know

## 2024-03-13 ENCOUNTER — TELEPHONE (OUTPATIENT)
Dept: PRIMARY CARE | Facility: CLINIC | Age: 44
End: 2024-03-13
Payer: COMMERCIAL

## 2024-03-13 DIAGNOSIS — M25.521 RIGHT ELBOW PAIN: Primary | ICD-10-CM

## 2024-03-13 NOTE — TELEPHONE ENCOUNTER
Patient is calling - saw you on 3/4/24 with right elbow pain - prescribed a medrol dospak - finished yesterday - this medication has brought no significant relief - wondering what next step would be?  Please advise.    [phone]

## 2024-03-13 NOTE — TELEPHONE ENCOUNTER
Lmom for patient to [facility]  [phone]    Need to give her the info for Dr Marty Garcia  [address] [facility] [address]   [place] [id number] [phone]

## 2024-03-22 LAB — NONINV COLON CA DNA+OCC BLD SCRN STL QL: NEGATIVE

## 2024-03-26 ENCOUNTER — HOSPITAL ENCOUNTER (OUTPATIENT)
Dept: RADIOLOGY | Facility: CLINIC | Age: 44
Discharge: HOME | End: 2024-03-26
Payer: COMMERCIAL

## 2024-03-26 ENCOUNTER — OFFICE VISIT (OUTPATIENT)
Dept: ORTHOPEDIC SURGERY | Facility: CLINIC | Age: 44
End: 2024-03-26
Payer: COMMERCIAL

## 2024-03-26 DIAGNOSIS — M25.521 RIGHT ELBOW PAIN: ICD-10-CM

## 2024-03-26 DIAGNOSIS — M75.20 BICEPS TENDINITIS, UNSPECIFIED LATERALITY: ICD-10-CM

## 2024-03-26 PROCEDURE — 99203 OFFICE O/P NEW LOW 30 MIN: CPT | Performed by: STUDENT IN AN ORGANIZED HEALTH CARE EDUCATION/TRAINING PROGRAM

## 2024-03-26 PROCEDURE — 3008F BODY MASS INDEX DOCD: CPT | Performed by: STUDENT IN AN ORGANIZED HEALTH CARE EDUCATION/TRAINING PROGRAM

## 2024-03-26 PROCEDURE — 73080 X-RAY EXAM OF ELBOW: CPT | Mod: RIGHT SIDE

## 2024-03-26 PROCEDURE — 1036F TOBACCO NON-USER: CPT | Performed by: STUDENT IN AN ORGANIZED HEALTH CARE EDUCATION/TRAINING PROGRAM

## 2024-03-26 PROCEDURE — 73080 X-RAY EXAM OF ELBOW: CPT | Mod: RT

## 2024-03-26 RX ORDER — MELOXICAM 15 MG/1
15 TABLET ORAL DAILY
Qty: 30 TABLET | Refills: 0 | Status: SHIPPED | OUTPATIENT
Start: 2024-03-26 | End: 2024-04-25

## 2024-03-26 NOTE — PROGRESS NOTES
History of Present Illness:  Presents with anterior arm/ elbow pain.  The symptoms have been present for months. The patient denies any inciting trauma. The pain is localized about the anterior elbow and arm. It is described as moderate. The pain occurs intermittantly and is worse with gripping and lifting activities. The patient presents due to persistent symptoms even with activity modification.      Review of Systems   GENERAL: Negative for malaise, significant weight loss, fever  MUSCULOSKELETAL: see HPI  NEURO:  Negative    The patient's past medical history, family history, social history, and review of systems were reviewed. History is otherwise negative except as stated in the HPI.    Physical Examination:  General: Alert and oriented to person, place, and time.  No acute distress and breathing comfortably: Pleasant and cooperative with examination.  HEENT: Head is normocephalic and atraumatic.  Neck: Supple, no visible swelling.  Cardiovascular: No palpable tachycardia  Lungs: No audible wheezing or labored breathing  Abdomen: Nondistended.  On musculoskeletal examination, the patient has full elbow range of motion. There is no obvious instability with varus and valgus testing. There is tenderness to palpation to biceps tendon musculotendon junction.  There is no tenderness to palpation medially, laterally, or posteriorly. Pain is illicited with resisted wrist flection and forearm pronation. Provocative maneuvers over the cubital tunnel are negative.  In regards to the wrist, there is no obvious deformity. Range of motion is full in flexion, extension, pronation, supination, and radial/ulnar deviation. There is no tenderness to palpation. Sensation and motor function are intact in the radial, ulnar, and median nerve distribution. There is no obvious thenar or intrinsic atrophy. All fingers are without triggering. The patient can make a full composite fist. The hand itself is warm and well perfused. The  skin is intact throughout. The contralateral hand and wrist are normal to inspection, range of motion, stability, and strength.    Imaging:  Radiographic notes:  AP, lateral, and oblique radiographs of the elbow. These reveal no fracture/dislocation and minimal-to-no arthritis.    Assessment:  Patient with right elbow and anterior arm pain.  Consistent with biceps tendinitis.    Plan:  I had a long discussion with the patient regarding the diagnosis of biceps tendinitis s and its treatment. I explained that it is a self-limiting condition that tends to resolve spontaneously.   Unfortunately, no treatments have been proven to alter the natural history of this condition. Moreover, it can take a relatively long time for symptom resolution, and therefore, I have preached patience.     For now, I have recommended symptomatic treatment consisting of activity modification, NSAIDs, and physical therapy.      Follow up: 6 weeks.  If no improvement will consider advanced imaging    Tracy Carrillo MD     no

## 2024-04-24 ENCOUNTER — DOCUMENTATION (OUTPATIENT)
Dept: PHYSICAL THERAPY | Facility: CLINIC | Age: 44
End: 2024-04-24
Payer: COMMERCIAL

## 2024-04-24 ENCOUNTER — EVALUATION (OUTPATIENT)
Dept: PHYSICAL THERAPY | Facility: CLINIC | Age: 44
End: 2024-04-24
Payer: COMMERCIAL

## 2024-04-24 DIAGNOSIS — M75.20 BICEPS TENDINITIS, UNSPECIFIED LATERALITY: ICD-10-CM

## 2024-04-24 PROCEDURE — 97161 PT EVAL LOW COMPLEX 20 MIN: CPT | Mod: GP | Performed by: PHYSICAL THERAPIST

## 2024-04-24 ASSESSMENT — PAIN - FUNCTIONAL ASSESSMENT: PAIN_FUNCTIONAL_ASSESSMENT: 0-10

## 2024-04-24 ASSESSMENT — PAIN SCALES - GENERAL: PAINLEVEL_OUTOF10: 4

## 2024-04-24 ASSESSMENT — PAIN DESCRIPTION - DESCRIPTORS: DESCRIPTORS: SORE;ACHING

## 2024-04-24 NOTE — PROGRESS NOTES
Physical Therapy    Physical Therapy Evaluation and Treatment      Patient Name: Tee Laughlin  MRN: [id number]  Today's Date: 4/24/2024  Time Calculation  Start Time: 1438  Stop Time: 1518  Time Calculation (min): 40 min  PT Evaluation Time Entry  PT Evaluation (Low) Time Entry: 35  PT Therapeutic Procedures Time Entry  Therapeutic Exercise Time Entry: 5    Insurance:  [facility]  10% coinsurance; $0 copay  60 V/Y  PA not req  Visit: 1  POC: 8    Assessment:  42 y/o F presents to outpatient physical therapy with reports of right arm pain d/t biceps tendinitis/strain. The patient presents with the current impairments of pain, painful ROM, weakness, and increased muscular tension. These impairments currently limit their ability to reach for objects, lift objects, reach above her head, twist her arm, and sleep. Due to the limitations listed above, the patient is currently at a decreased functional level compared to baseline, and they would benefit from skilled physical therapy to improve pain intensity, strength, flexibility, and facilitate a safe and efficient return to functional baseline. Patient's prognosis for improvement with therapy is good at this time.  PT Assessment  PT Assessment Results: Decreased strength, Pain  Rehab Prognosis: Good  Evaluation/Treatment Tolerance: Patient tolerated treatment well     Plan:  OP PT Plan  Treatment/Interventions: Aquatic therapy, Cryotherapy, Dry needling, Education/ Instruction, Electrical stimulation, Hot pack, Manual therapy, Neuromuscular re-education, Self care/ home management, Taping techniques, Therapeutic activities, Therapeutic exercises, Ultrasound, Vasopneumatic device  PT Plan: Skilled PT  PT Frequency: 1 time per week  Duration: 7 additional visits  Onset Date: 03/26/24  Certification Period Start Date: 04/24/24  Certification Period End Date: 07/23/24  Rehab Potential: Good  Plan of Care Agreement: Patient    Complexity:  Low    Current Problem:   1. Biceps  tendinitis, unspecified laterality  Referral to Physical Therapy    Follow Up In Physical Therapy          Subjective    Patient reports to OPPT with complaints of right biceps pain for the last 3-4 months. She notes that the pain has been slowly getting worse over the past few months. Pain is located in the middle portion of the bicipital region, denies any pain in the elbow or shoulder at this time. Pain is a 4/10 at this time, 9/10 at worst, 2/10 at best. Denies any radiating pain down or up the arm, denies numbness and tingling. Increased pain with most movements of the arm including using her phone, using her mouse at work, lifting any weight, reaching, twisting the arm, reaching/lifting above the head, and sleeping (wakes her a few times a week). She was given meloxicam without much relief.  General:  General  Reason for Referral: R biceps pain  Referred By: Dr. Tracy Carrillo  Past Medical History Relevant to Rehab: hx of hemochromostosis, back pain  Precautions:  Precautions  Precautions Comment: hx of hemochromostosis, back pain  Pain:  Pain Assessment  Pain Assessment: 0-10  Pain Score: 4  Pain Location: Arm  Pain Orientation: Right  Pain Descriptors: Sore, Aching  Prior Level of Function:  Prior Function Per Pt/Caregiver Report  Prior Function Comments: Independent in all ADLs and desired activities, limited due to history of low back pain    Objective   Elbow AROM (*indicates pain)  Flexion R 146*, L 145  Extension R 2 hyper, L 3 hyper  Supination R 81, L 87  Pronation R 72, L 80    Shoulder AROM (*indicates pain)  Flexion R 171, L 171  Abduction R 168, L 172  IR R T9, L T9  ER R T3, L T3    Shoulder strength (*indicates pain):  Flexion R 4/5*, L 4+/5  Abd  R 4/5*, L 4+/5  IR  R 4+/5, L 4+/5  ER R 4/5*, L 4+/5    Elbow strength (*indicates pain)  Flexion R 4/5*, L 5/5  Extension R 4+/5*, L 5/5  Pronation R 4+/5, L 5/5  Supination R 4/5*, L 5/5    Dermatomes: intact   Palpation: mild tenderness  throughout the muscle belly of the biceps on the right. 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, - lift off, - hornblower      Outcome Measures:  Other Measures  Disability of Arm Shoulder Hand (DASH): 31; 45.45%     Treatments:  HEP reviewed x5'    EDUCATION:  Outpatient Education  Individual(s) Educated: Patient  Education Provided: Anatomy, Body Mechanics, Physiology, POC, Home Exercise Program  Risk and Benefits Discussed with Patient/Caregiver/Other: yes  Patient/Caregiver Demonstrated Understanding: yes  Plan of Care Discussed and Agreed Upon: yes  Patient Response to Education: Patient/Caregiver Verbalized Understanding of Information, Patient/Caregiver Asked Appropriate Questions    Extensive education provided regarding not pushing exercises into excessive pain to avoid regression and further irritation to the injured tissue. Education also provided to discontinue any exercise that increases pain more than expected muscular soreness.    HEP:  Exercises  - Standing Bicep Stretch at Wall  - 2 x daily - 7 x weekly - 1 sets - 3 reps - 30 sec hold  - Standing Shoulder Row with Anchored Resistance  - 1 x daily - 7 x weekly - 2 sets - 10 reps - 3 sec hold  - Shoulder extension with resistance - Neutral  - 1 x daily - 7 x weekly - 2 sets - 10 reps  - Shoulder External Rotation Reactive Isometrics  - 1 x daily - 7 x weekly - 2 sets - 10 reps - 3 sec hold  - Shoulder Internal Rotation Reactive Isometrics  - 1 x daily - 7 x weekly - 3 sets - 10 reps - 3 sec hold  Goals:  By discharge:  1. Patient will report and demonstrate independence with established HEP  2. Patient will report pain of 0/10 at rest, and no greater than 2/10 with any activity including lifting, carrying, reaching, and participating in recreation activities   3. Patient will demonstrate the ability to lift a 4lb weight onto a shelf above  shoulder height and back to waist height 10x consecutively and without an increase in pain greater than 2/10 to indicate improved ability to perform overhead lifting/reaching  4. Patient will demonstrate gross shoulder strength of >/= 4+/5 to increase their ability to perform all daily and work tasks  5. Patient will demonstrate a decrease in QuickDash score by 11 points to </=  20/55 to meet established MCID for the outcome measure (baseline 4/24/24 31/55)  6. Patient will report the ability to sleep through the night 6/7 nights per week uninterrupted by pain to improve overall function throughout the day  7. Patient will demonstrate the ability to perform a 20lb box carry 50' x2 without pain in the right upper extremity exceeding 2/10 to indicate improved ability to perform light activities within the home  8. Patient will report the ability to return to all desired upper body exercises at the gym without any perceived restriction x2 weeks to indicate decreased tissue irritation and a return to normal activities  9. Patient will demonstrate pain free and symmetrical AROM of the bilateral elbows (flexion,extension,supination,pronation) to improve her ability to lift, carry, and participate in recreation activities without restriction

## 2024-04-24 NOTE — PROGRESS NOTES
Physical Therapy    Discharge Summary    Name: Tee Laughlin  MRN: [id number]  : 1980  Date: 24    Discharge Summary: PT    Discharge Information: Date of discharge 24, Date of last visit 1/3/24, Date of evaluation 24, Number of attended visits 11, Referred by Dr Dontrell Blue, and Referred for Low back pain    Discharge Status: Condition of patient's low back pain is unknown at this time, patient is set to be evaluated this date for a different issue     Rehab Discharge Reason: At the time of the patient's last appointment for her low back, PT was placed on hold for a maximum of 30 days while she continued with her home exercises. She was instructed to contact this office if additional skilled PT was required. As it has now been greater than 30 days, PT recommends discharge of case at this time    The patient is returning this date for an initial evaluation for a different issue, and PT will commence with treatment for that if warranted

## 2024-05-01 ENCOUNTER — TREATMENT (OUTPATIENT)
Dept: PHYSICAL THERAPY | Facility: CLINIC | Age: 44
End: 2024-05-01
Payer: COMMERCIAL

## 2024-05-01 DIAGNOSIS — M75.20 BICEPS TENDINITIS, UNSPECIFIED LATERALITY: Primary | ICD-10-CM

## 2024-05-01 PROCEDURE — 97110 THERAPEUTIC EXERCISES: CPT | Mod: GP,CQ

## 2024-05-01 PROCEDURE — 97140 MANUAL THERAPY 1/> REGIONS: CPT | Mod: GP,CQ

## 2024-05-01 ASSESSMENT — PAIN SCALES - GENERAL: PAINLEVEL_OUTOF10: 4

## 2024-05-01 ASSESSMENT — PAIN - FUNCTIONAL ASSESSMENT: PAIN_FUNCTIONAL_ASSESSMENT: 0-10

## 2024-05-01 NOTE — PROGRESS NOTES
"Physical Therapy    Physical Therapy Evaluation and Treatment      Patient Name: Tee Laughlin  MRN: [id number]  Today's Date: 5/1/2024  Time Calculation  Start Time: 0848  Stop Time: 0930  Time Calculation (min): 42 min     PT Therapeutic Procedures Time Entry  Manual Therapy Time Entry: 20  Therapeutic Exercise Time Entry: 20    Insurance:  [facility]  10% coinsurance; $0 copay  60 V/Y  PA not req  Visit: 2  POC: 8    Assessment: Pt unable to aj much of tx/ HEP w/o increased pain. Decreased muscle tightness noted after manual. Pt notes increase R biceps soreness noted after tx. Pt re-education to not increase pain/ sxs. Patient would benefit from P.T. to continue to address impairments in order to improve strength, flexibility, posture, and  to decrease symptoms and increase overall function.      PT Assessment  Evaluation/Treatment Tolerance: Patient tolerated treatment well     Plan: Assess VANESSA Villarreal follow up 5/21/24.  OP PT Plan  PT Plan: Skilled PT    Complexity:  Low    Current Problem:   1. Biceps tendinitis, unspecified laterality  Follow Up In Physical Therapy            Subjective    Patient reports \"it's the same\". Also reports HEP makes it worse after doing HEP. Notes some tingling if any pressure on the biceps. Still very weak and painful.    General:  General  Reason for Referral: R biceps pain  Referred By: Dr. Tracy Carrillo  Past Medical History Relevant to Rehab: hx of hemochromostosis, back pain    Precautions:  Precautions  STEADI Fall Risk Score (The score of 4 or more indicates an increased risk of falling): 0  Precautions Comment: hx of hemochromostosis, back pain    Pain:  Pain Assessment  Pain Assessment: 0-10  Pain Score: 4      Objective   Point tenderness throughout R biceps and reports tingling w/ even gentle pressure applied.        Treatments:  Therapeutic Exercise:  Standing Bicep Stretch at Wall  - 3 reps - 30 sec hold  - Standing Shoulder Row with Anchored Resistance  " "BTT- 2 sets - 10 reps - 3 sec hold  - Shoulder extension with resistance - Neutral  BLKTT- 2 sets - 10 reps  - Shoulder External Rotation Reactive Isometrics  - pain/ \"burning\" therefore DC (attempted   - Shoulder Internal Rotation Reactive Isometrics RTB  - 2 sets - 10 reps - 3 sec hold  Prone shoulder T and ext to neutral 0# x10ea    Manual:  Very STM// MFR/ gentle TPR to R biceps. KT \"I\" strip (0%) and star at distal painful spot (~25%). Pt given written and verbal tape wearing instructions.      EDUCATION:   Extensive education provided regarding not pushing exercises into excessive pain to avoid regression and further irritation to the injured tissue. Education also provided to discontinue any exercise that increases pain more than expected muscular soreness.    HEP:  Exercises  - Standing Bicep Stretch at Wall  - 2 x daily - 7 x weekly - 1 sets - 3 reps - 30 sec hold  - Standing Shoulder Row with Anchored Resistance  - 1 x daily - 7 x weekly - 2 sets - 10 reps - 3 sec hold  - Shoulder extension with resistance - Neutral  - 1 x daily - 7 x weekly - 2 sets - 10 reps  - Shoulder External Rotation Reactive Isometrics  - 1 x daily - 7 x weekly - 2 sets - 10 reps - 3 sec hold  - Shoulder Internal Rotation Reactive Isometrics  - 1 x daily - 7 x weekly - 3 sets - 10 reps - 3 sec hold    Goals:  By discharge:  1. Patient will report and demonstrate independence with established HEP  2. Patient will report pain of 0/10 at rest, and no greater than 2/10 with any activity including lifting, carrying, reaching, and participating in recreation activities   3. Patient will demonstrate the ability to lift a 4lb weight onto a shelf above shoulder height and back to waist height 10x consecutively and without an increase in pain greater than 2/10 to indicate improved ability to perform overhead lifting/reaching  4. Patient will demonstrate gross shoulder strength of >/= 4+/5 to increase their ability to perform all daily and " work tasks  5. Patient will demonstrate a decrease in QuickDash score by 11 points to </=  20/55 to meet established MCID for the outcome measure (baseline 4/24/24 31/55)  6. Patient will report the ability to sleep through the night 6/7 nights per week uninterrupted by pain to improve overall function throughout the day  7. Patient will demonstrate the ability to perform a 20lb box carry 50' x2 without pain in the right upper extremity exceeding 2/10 to indicate improved ability to perform light activities within the home  8. Patient will report the ability to return to all desired upper body exercises at the gym without any perceived restriction x2 weeks to indicate decreased tissue irritation and a return to normal activities  9. Patient will demonstrate pain free and symmetrical AROM of the bilateral elbows (flexion,extension,supination,pronation) to improve her ability to lift, carry, and participate in recreation activities without restriction

## 2024-05-03 ENCOUNTER — TELEPHONE (OUTPATIENT)
Dept: PRIMARY CARE | Facility: CLINIC | Age: 44
End: 2024-05-03
Payer: COMMERCIAL

## 2024-05-03 DIAGNOSIS — M54.50 LOWER BACK PAIN: ICD-10-CM

## 2024-05-03 DIAGNOSIS — M54.9 BACK PAIN: Primary | ICD-10-CM

## 2024-05-03 NOTE — TELEPHONE ENCOUNTER
----- Message from Tee Laughlin sent at 5/2/2024  1:19 PM EDT -----  Regarding: PT for back  Contact: [phone]  [facility] Dr. Ferrell,  I have started to get more and more pain in my lower right back again even with me stretching and sitting less. I would like to try a  different physical therapy group to help witb my back pain.   Would you please prescribe an order for me to try therapy again with this different group for my lower right back pain?   Thank you, Tee

## 2024-05-07 ENCOUNTER — APPOINTMENT (OUTPATIENT)
Dept: ORTHOPEDIC SURGERY | Facility: CLINIC | Age: 44
End: 2024-05-07
Payer: COMMERCIAL

## 2024-05-08 ENCOUNTER — TREATMENT (OUTPATIENT)
Dept: PHYSICAL THERAPY | Facility: CLINIC | Age: 44
End: 2024-05-08
Payer: COMMERCIAL

## 2024-05-08 DIAGNOSIS — M75.20 BICEPS TENDINITIS, UNSPECIFIED LATERALITY: Primary | ICD-10-CM

## 2024-05-08 PROCEDURE — 97110 THERAPEUTIC EXERCISES: CPT | Mod: GP | Performed by: PHYSICAL THERAPIST

## 2024-05-08 ASSESSMENT — PAIN SCALES - GENERAL: PAINLEVEL_OUTOF10: 5 - MODERATE PAIN

## 2024-05-08 ASSESSMENT — PAIN - FUNCTIONAL ASSESSMENT: PAIN_FUNCTIONAL_ASSESSMENT: 0-10

## 2024-05-08 NOTE — PROGRESS NOTES
Physical Therapy    Physical Therapy Treatment    Patient Name: Tee Laughlin  MRN: [id number]  Today's Date: 5/8/2024  Time Calculation  Start Time: 1302  Stop Time: 1340  Time Calculation (min): 38 min  PT Therapeutic Procedures Time Entry  Therapeutic Exercise Time Entry: 31    Insurance:  [facility]  10% coinsurance; $0 copay  60 V/Y  PA not req  Visit: 3  POC: 8    Assessment:  PT attempts to progress therapeutic exercises targeting improving right elbow/arm strength and flexibility. Patient reports increased pain of the right arm with most movements and activities this date. Tenderness to palpation in the right distal 1/3 of the biceps is noted this date, therefore manual therapy is held due to increased pain. PT attempts to introduce light isometric exercises for the right upper extremity, but patient reports pain in the right biceps region even with submaximal isometrics. PT continues with kinesotaping due to good response following last session. She is scheduled to follow up with orthopedics at the end of the week, and PT will plan to adjust POC as needed following that appointment.     Plan:  OP PT Plan  PT Plan: Skilled PT  Continue to progress upper extremity strength and flexibility as tolerated  Current Problem  1. Biceps tendinitis, unspecified laterality  Follow Up In Physical Therapy          General     General  Reason for Referral: R biceps pain  Referred By: Dr. Tracy Carrillo  Past Medical History Relevant to Rehab: hx of hemochromostosis, back pain    Subjective    Patient reports that her right arm continues to bother her at this time. She notes that she is able to do the stretch the arm, but has a hard time doing any of the strengthening exercises with the bands due to the pain. She has been icing the arm which has been helping slightly. She notes some mild improvement with KT following last session.   Precautions  Precautions  STEADI Fall Risk Score (The score of 4 or more indicates an  "increased risk of falling): 0  Precautions Comment: hx of hemochromostosis, back pain  Pain  Pain Assessment  Pain Assessment: 0-10  Pain Score: 5 - Moderate pain  Pain Location: Arm  Pain Orientation: Right    Objective   Tenderness to palpation  to the right distal 1/3 of the biceps on the right this date  Treatments:  Therapeutic exercises:  Pulleys flexion/scaption x2' ea  Tricep pulldown GTT x15  Shoulder isometrics flexion/extension only (increased pain following flexion) 10x5\"   Biceps stretch at wall 10x5\" ea     KT to the R biceps \"I\" strip to the biceps 50% tension, \"I\" strip for scapular retraction, star to distal biceps   (31')    OP EDUCATION:     Education regarding icing to the right upper extremity to help manage pain  Goals:  By discharge:  1. Patient will report and demonstrate independence with established HEP  2. Patient will report pain of 0/10 at rest, and no greater than 2/10 with any activity including lifting, carrying, reaching, and participating in recreation activities   3. Patient will demonstrate the ability to lift a 4lb weight onto a shelf above shoulder height and back to waist height 10x consecutively and without an increase in pain greater than 2/10 to indicate improved ability to perform overhead lifting/reaching  4. Patient will demonstrate gross shoulder strength of >/= 4+/5 to increase their ability to perform all daily and work tasks  5. Patient will demonstrate a decrease in QuickDash score by 11 points to </=  20/55 to meet established MCID for the outcome measure (baseline 4/24/24 31/55)  6. Patient will report the ability to sleep through the night 6/7 nights per week uninterrupted by pain to improve overall function throughout the day  7. Patient will demonstrate the ability to perform a 20lb box carry 50' x2 without pain in the right upper extremity exceeding 2/10 to indicate improved ability to perform light activities within the home  8. Patient will report the ability " to return to all desired upper body exercises at the gym without any perceived restriction x2 weeks to indicate decreased tissue irritation and a return to normal activities  9. Patient will demonstrate pain free and symmetrical AROM of the bilateral elbows (flexion,extension,supination,pronation) to improve her ability to lift, carry, and participate in recreation activities without restriction

## 2024-05-10 ENCOUNTER — APPOINTMENT (OUTPATIENT)
Dept: ORTHOPEDIC SURGERY | Facility: CLINIC | Age: 44
End: 2024-05-10
Payer: COMMERCIAL

## 2024-05-14 ENCOUNTER — OFFICE VISIT (OUTPATIENT)
Dept: ORTHOPEDIC SURGERY | Facility: CLINIC | Age: 44
End: 2024-05-14
Payer: COMMERCIAL

## 2024-05-14 DIAGNOSIS — M75.21 BICEPS TENDINITIS ON RIGHT: ICD-10-CM

## 2024-05-14 PROCEDURE — 1036F TOBACCO NON-USER: CPT | Performed by: STUDENT IN AN ORGANIZED HEALTH CARE EDUCATION/TRAINING PROGRAM

## 2024-05-14 PROCEDURE — 99213 OFFICE O/P EST LOW 20 MIN: CPT | Performed by: STUDENT IN AN ORGANIZED HEALTH CARE EDUCATION/TRAINING PROGRAM

## 2024-05-14 PROCEDURE — 3008F BODY MASS INDEX DOCD: CPT | Performed by: STUDENT IN AN ORGANIZED HEALTH CARE EDUCATION/TRAINING PROGRAM

## 2024-05-14 NOTE — PROGRESS NOTES
History of Present Illness  Patient returns today for evaluation of right elbow anterior arm pain consistent with insertional biceps tendinitis.  Has had course of Mobic and physical therapy without improvement.  This is very debilitating for her.     Physical Examination:  Right upper extremity:  The patient appears to be their stated age, is in no apparent distress, and is oriented x3. The patients mood and affect are appropriate. The patients gait is normal. The examination of the limb in question was performed in comparison to the contralateral limb.    On musculoskeletal examination, tenderness palpation about the biceps insertion and musculotendinous junction.    Sensation and motor function are intact in the radial, and median nerve distribution. There is no obvious thenar atrophy, and thenar strength is 5/5. There is no intrinsic atrophy, and intrinsic strength is 5/5.  The patient can make a full composite fist. The hand itself is warm and well perfused. The skin is intact throughout. The contralateral hand/wrist are normal to inspection, range of motion, stability, and strength.        Assessment:  Patient with right biceps insertional tendinitis.  Considerable pain on exam that is very debilitating.  Would like an MRI of the arm including the elbow to fully evaluate and ensure no masses or lesions that could be causing this level of pain.  If this is negative I recommended that she follow-up with my partner Dr. Budinsky for ultrasound-guided cortisone injection into the distal biceps.    Plan:   MRI arm.  Consider cortisone injection into distal biceps following MRI.  All questions were addressed.    Tracy Yeager MD

## 2024-05-15 ENCOUNTER — TREATMENT (OUTPATIENT)
Dept: PHYSICAL THERAPY | Facility: CLINIC | Age: 44
End: 2024-05-15
Payer: COMMERCIAL

## 2024-05-15 DIAGNOSIS — M75.20 BICEPS TENDINITIS, UNSPECIFIED LATERALITY: Primary | ICD-10-CM

## 2024-05-15 PROCEDURE — 97110 THERAPEUTIC EXERCISES: CPT | Mod: GP,CQ

## 2024-05-15 ASSESSMENT — PAIN SCALES - GENERAL: PAINLEVEL_OUTOF10: 3

## 2024-05-15 ASSESSMENT — PAIN - FUNCTIONAL ASSESSMENT: PAIN_FUNCTIONAL_ASSESSMENT: 0-10

## 2024-05-15 NOTE — PROGRESS NOTES
"Physical Therapy    Physical Therapy Treatment    Patient Name: Tee Laughlin  MRN: [id number]  Today's Date: 5/15/2024  Time Calculation  Start Time: 0933  Stop Time: 1017  Time Calculation (min): 44 min  PT Therapeutic Procedures Time Entry  Therapeutic Exercise Time Entry: 29    Insurance:  [facility]  10% coinsurance; $0 copay  60 V/Y  PA not req  Visit: 4  POC: 8    Assessment:  PT attempts to progress therapeutic exercises targeting improving right elbow/arm strength and flexibility. Patient reports increased soreness \"but not pain\" of the right biceps with most movements and activities this date. PT attempts to introduce light isometric exercises for the right upper extremity, but patient reports pain in the right biceps region even with submaximal isometrics still. PT continues with kinesotaping due to good response following last session. She is to schedule MRI when approved and possible US guided cortisone if needed. No skin abnormality noted after modalities.  Patient would benefit from P.T. to continue to address impairments in order to improve strength, flexibility, posture, and  to decrease symptoms and increase overall function.    PT Assessment  Evaluation/Treatment Tolerance: Patient limited by pain  Plan: Dr ordered MRI; awaiting approval.  OP PT Plan  PT Plan: Skilled PT  Continue to progress upper extremity strength and flexibility as tolerated    Current Problem  1. Biceps tendinitis, unspecified laterality  Follow Up In Physical Therapy          General     General  Reason for Referral: R biceps pain  Referred By: Dr. Tracy Carrillo  Past Medical History Relevant to Rehab: hx of hemochromostosis, back pain    Subjective    Patient reports that her right arm is feeling a little better. Notes being able to do some gardening w/o increased pain, \"just a little sore after.\" Reports ~ 2-3/10 pain currently.    Precautions  Precautions  STEADI Fall Risk Score (The score of 4 or more " "indicates an increased risk of falling): 0  Precautions Comment: hx of hemochromostosis, back pain  Pain  Pain Assessment  Pain Assessment: 0-10  Pain Score: 3    Objective   Tenderness to palpation  to the right distal 1/3 of the biceps on the right this date    Treatments:  Therapeutic exercises:  Pulleys flexion/scaption 10\"x2' ea  Tricep pulldown 3 way GTT x15ea  Shoulder isometrics flexion/extension only (increased pain following flexion) 10x5\"   Biceps stretch at wall 3x30\" ea   Row RTT x15  LAE RTT x15    KT to the R biceps \"I\" strip to the biceps 50% tension, \"I\" strip for scapular retraction, star to distal biceps     Modalities:  CP to R biceps after tx x15' (sitting w/ R UE supported).    OP EDUCATION:     Education regarding icing to the right upper extremity to help manage pain  Goals:  By discharge:  1. Patient will report and demonstrate independence with established HEP  2. Patient will report pain of 0/10 at rest, and no greater than 2/10 with any activity including lifting, carrying, reaching, and participating in recreation activities   3. Patient will demonstrate the ability to lift a 4lb weight onto a shelf above shoulder height and back to waist height 10x consecutively and without an increase in pain greater than 2/10 to indicate improved ability to perform overhead lifting/reaching  4. Patient will demonstrate gross shoulder strength of >/= 4+/5 to increase their ability to perform all daily and work tasks  5. Patient will demonstrate a decrease in QuickDash score by 11 points to </=  20/55 to meet established MCID for the outcome measure (baseline 4/24/24 31/55)  6. Patient will report the ability to sleep through the night 6/7 nights per week uninterrupted by pain to improve overall function throughout the day  7. Patient will demonstrate the ability to perform a 20lb box carry 50' x2 without pain in the right upper extremity exceeding 2/10 to indicate improved ability to perform light " activities within the home  8. Patient will report the ability to return to all desired upper body exercises at the gym without any perceived restriction x2 weeks to indicate decreased tissue irritation and a return to normal activities  9. Patient will demonstrate pain free and symmetrical AROM of the bilateral elbows (flexion,extension,supination,pronation) to improve her ability to lift, carry, and participate in recreation activities without restriction

## 2024-05-21 ENCOUNTER — APPOINTMENT (OUTPATIENT)
Dept: ORTHOPEDIC SURGERY | Facility: CLINIC | Age: 44
End: 2024-05-21
Payer: COMMERCIAL

## 2024-05-22 ENCOUNTER — TREATMENT (OUTPATIENT)
Dept: PHYSICAL THERAPY | Facility: CLINIC | Age: 44
End: 2024-05-22
Payer: COMMERCIAL

## 2024-05-22 DIAGNOSIS — M75.20 BICEPS TENDINITIS, UNSPECIFIED LATERALITY: Primary | ICD-10-CM

## 2024-05-22 PROCEDURE — 97110 THERAPEUTIC EXERCISES: CPT | Mod: GP,CQ

## 2024-05-22 ASSESSMENT — PAIN - FUNCTIONAL ASSESSMENT: PAIN_FUNCTIONAL_ASSESSMENT: 0-10

## 2024-05-22 ASSESSMENT — PAIN SCALES - GENERAL: PAINLEVEL_OUTOF10: 4

## 2024-05-22 NOTE — PROGRESS NOTES
"Physical Therapy    Physical Therapy Treatment    Patient Name: Tee Laughlin  MRN: [id number]  Today's Date: 5/22/2024  Time Calculation  Start Time: 1015  Stop Time: 1100  Time Calculation (min): 45 min  PT Therapeutic Procedures Time Entry  Therapeutic Exercise Time Entry: 35    Insurance:  [facility]  10% coinsurance; $0 copay  60 V/Y  PA not req  Visit: 5  POC: 8    Assessment:  Patient reports some increased pain with shoulder isometrics therefore Dced flexion. Otherwise increased soreness \"but not pain\" of the right bicep. PT attempts to introduce light isometric exercises for the right upper extremity, but patient reports pain in the right biceps region even with submaximal isometrics still.  Notes being able to do HEP of shoulder tband isometrics w/o issue recently. PT continues with kinesotaping due to good response following last session. She is to schedule MRI 5/30/24 and possible US guided cortisone after that if needed. No skin abnormality noted after modalities.  Patient would benefit from P.T. to continue to address impairments in order to improve strength, flexibility, posture, and  to decrease symptoms and increase overall function.    PT Assessment  Evaluation/Treatment Tolerance: Patient limited by pain    Plan: Dr ordered MRI scheduled for 5/30/24. Cont to strengthen as able w/o increasing pain in biceps.  OP PT Plan  PT Plan: Skilled PT      Current Problem  1. Biceps tendinitis, unspecified laterality  Follow Up In Physical Therapy            General     General  Reason for Referral: R biceps pain  Referred By: Dr. Tracy Carrillo  Past Medical History Relevant to Rehab: hx of hemochromostosis, back pain    Subjective    Patient reports that her right arm is feeling a little better. Notes being able to do more w/o increased pain, \"just a little sore after.\" Reports ~ 3-4/10 pain currently.    Precautions  Precautions  STEADI Fall Risk Score (The score of 4 or more indicates an " "increased risk of falling): 0  Precautions Comment: hx of hemochromostosis, back pain    Pain  Pain Assessment  Pain Assessment: 0-10  Pain Score: 4    Objective   Tenderness to gentle palpation  to the right distal 1/3 of the biceps on the right still.      Treatments:  Therapeutic exercises:  Pulleys flexion/scaption 10\"x2' ea  Tricep pulldown 3 way YTT x15ea  Shoulder isometrics ALL (increased pain following flexion therefore DC) 10x5\" ea  Biceps stretch at wall 3x30\" ea   Row RTT x15  LAE RTT x15    KT to the R biceps \"I\" strip to the biceps 50% tension, \"I\" strip for scapular retraction, star to distal biceps     Modalities:  CP to R biceps after tx x12' (sitting w/ R UE supported).    OP EDUCATION:     Education regarding icing to the right upper extremity to help manage pain    Goals:  By discharge:  1. Patient will report and demonstrate independence with established HEP  2. Patient will report pain of 0/10 at rest, and no greater than 2/10 with any activity including lifting, carrying, reaching, and participating in recreation activities   3. Patient will demonstrate the ability to lift a 4lb weight onto a shelf above shoulder height and back to waist height 10x consecutively and without an increase in pain greater than 2/10 to indicate improved ability to perform overhead lifting/reaching  4. Patient will demonstrate gross shoulder strength of >/= 4+/5 to increase their ability to perform all daily and work tasks  5. Patient will demonstrate a decrease in QuickDash score by 11 points to </=  20/55 to meet established MCID for the outcome measure (baseline 4/24/24 31/55)  6. Patient will report the ability to sleep through the night 6/7 nights per week uninterrupted by pain to improve overall function throughout the day  7. Patient will demonstrate the ability to perform a 20lb box carry 50' x2 without pain in the right upper extremity exceeding 2/10 to indicate improved ability to perform light activities " within the home  8. Patient will report the ability to return to all desired upper body exercises at the gym without any perceived restriction x2 weeks to indicate decreased tissue irritation and a return to normal activities  9. Patient will demonstrate pain free and symmetrical AROM of the bilateral elbows (flexion,extension,supination,pronation) to improve her ability to lift, carry, and participate in recreation activities without restriction

## 2024-05-29 ENCOUNTER — DOCUMENTATION (OUTPATIENT)
Dept: PHYSICAL THERAPY | Facility: CLINIC | Age: 44
End: 2024-05-29

## 2024-05-29 NOTE — PROGRESS NOTES
Pt arrived for 8:45 appointment. She reports continued pain in right UE. She states that pain seems to be around entire elbow now. She states that she is still having difficulty performing some of the HEP due to pain.   Minimal swelling is noted distal aspect of right bicep. Pt has MRI scheduled for tomorrow. After speaking with TS, PT and patient, it was decided that PT be placed on hold until after MRI. No treatment provided today.

## 2024-05-30 ENCOUNTER — HOSPITAL ENCOUNTER (OUTPATIENT)
Dept: RADIOLOGY | Facility: CLINIC | Age: 44
Discharge: HOME | End: 2024-05-30
Payer: COMMERCIAL

## 2024-05-30 DIAGNOSIS — M75.21 BICEPS TENDINITIS ON RIGHT: ICD-10-CM

## 2024-05-30 PROCEDURE — 73218 MRI UPPER EXTREMITY W/O DYE: CPT | Mod: RT

## 2024-05-30 PROCEDURE — 73218 MRI UPPER EXTREMITY W/O DYE: CPT | Mod: RIGHT SIDE | Performed by: STUDENT IN AN ORGANIZED HEALTH CARE EDUCATION/TRAINING PROGRAM

## 2024-06-05 ENCOUNTER — TREATMENT (OUTPATIENT)
Dept: PHYSICAL THERAPY | Facility: CLINIC | Age: 44
End: 2024-06-05
Payer: COMMERCIAL

## 2024-06-05 DIAGNOSIS — M75.20 BICEPS TENDINITIS, UNSPECIFIED LATERALITY: ICD-10-CM

## 2024-06-05 PROCEDURE — 97035 APP MDLTY 1+ULTRASOUND EA 15: CPT | Mod: GP,CQ

## 2024-06-05 PROCEDURE — 97140 MANUAL THERAPY 1/> REGIONS: CPT | Mod: GP,CQ

## 2024-06-05 ASSESSMENT — PAIN - FUNCTIONAL ASSESSMENT: PAIN_FUNCTIONAL_ASSESSMENT: 0-10

## 2024-06-05 ASSESSMENT — PAIN SCALES - GENERAL: PAINLEVEL_OUTOF10: 6

## 2024-06-05 NOTE — PROGRESS NOTES
Physical Therapy    Physical Therapy Treatment    Patient Name: Tee Laughlin  MRN: [id number]  Today's Date: 6/5/2024  Time Calculation  Start Time: 1017  Stop Time: 1100  Time Calculation (min): 43 min  PT Therapeutic Procedures Time Entry  Manual Therapy Time Entry: 28  PT Modalities Time Entry  Ultrasound Time Entry: 10      Insurance:  [facility]  10% coinsurance; $0 copay  60 V/Y  PA not req  Visit: 6    POC: 8    Assessment:  Decreased ther ex secondary to increased pain this date. US/ manual to R lat epicondyle musc. Pt still deciding on US guided cortisone if needed. No skin abnormality noted after modalities.  Pt continues to note significant pain (as upon arrival) w/elbow flexion after tx. Patient would benefit from P.T. to continue to address impairments in order to improve strength, flexibility, posture, and  to decrease symptoms and increase overall function.    PT Assessment  Evaluation/Treatment Tolerance: Patient limited by pain    Plan: PT to assess elbow NV.  OP PT Plan  PT Plan: Skilled PT    Current Problem  1. Biceps tendinitis, unspecified laterality  Follow Up In Physical Therapy              General     General  Reason for Referral: R biceps pain  Referred By: Dr. Tracy Carrillo  Past Medical History Relevant to Rehab: hx of hemochromostosis, back pain    Subjective    Patient reports that she's frustrated w/ lack of progress despite resting her arm for so long. Notes increased pain recently in medial elbow.    Precautions  Precautions  STEADI Fall Risk Score (The score of 4 or more indicates an increased risk of falling): 0  Precautions Comment: hx of hemochromostosis, back pain    Pain  Pain Assessment  Pain Assessment: 0-10  Pain Score: 6    Objective   Tenderness to gentle palpation  to the right distal 1/3 of the biceps on the right still, as well as medial and lat epicondylar musculature.      MRI Results:  IMPRESSION:  1.  Tendinosis with probable low-grade interstitial  "tearing of the  common extensor tendon origin.  2. Normal appearing biceps.  3. Otherwise, unremarkable MRI of the right upper arm and elbow  noting evaluation of the elbow is limited due to large field-of-view.    Treatments:  Therapeutic exercises:   common extensors stretch 3x30\"       Manual:  Very light STM/ TPR to R  common extensors, mid biceps, and medial epicondylar musc.  KT to the R common extensors origin at elbow star (50%) and \"I\" strips to common extensors musc and medial epicondylar musc (0%).    Modalities:  US at 1.5w/cm2 at 1MHz at 50% to R common extensors musc. x10'  (supine w/ R UE supported).    OP EDUCATION:     Education regarding alt ice/ heat to the right elbow area to help increase healing.    Goals:  By discharge:  1. Patient will report and demonstrate independence with established HEP  2. Patient will report pain of 0/10 at rest, and no greater than 2/10 with any activity including lifting, carrying, reaching, and participating in recreation activities   3. Patient will demonstrate the ability to lift a 4lb weight onto a shelf above shoulder height and back to waist height 10x consecutively and without an increase in pain greater than 2/10 to indicate improved ability to perform overhead lifting/reaching  4. Patient will demonstrate gross shoulder strength of >/= 4+/5 to increase their ability to perform all daily and work tasks  5. Patient will demonstrate a decrease in QuickDash score by 11 points to </=  20/55 to meet established MCID for the outcome measure (baseline 4/24/24 31/55)  6. Patient will report the ability to sleep through the night 6/7 nights per week uninterrupted by pain to improve overall function throughout the day  7. Patient will demonstrate the ability to perform a 20lb box carry 50' x2 without pain in the right upper extremity exceeding 2/10 to indicate improved ability to perform light activities within the home  8. Patient will report the ability to return to " all desired upper body exercises at the gym without any perceived restriction x2 weeks to indicate decreased tissue irritation and a return to normal activities  9. Patient will demonstrate pain free and symmetrical AROM of the bilateral elbows (flexion,extension,supination,pronation) to improve her ability to lift, carry, and participate in recreation activities without restriction

## 2024-06-06 ENCOUNTER — APPOINTMENT (OUTPATIENT)
Dept: ORTHOPEDIC SURGERY | Facility: CLINIC | Age: 44
End: 2024-06-06
Payer: COMMERCIAL

## 2024-06-12 ENCOUNTER — APPOINTMENT (OUTPATIENT)
Dept: PHYSICAL THERAPY | Facility: CLINIC | Age: 44
End: 2024-06-12
Payer: COMMERCIAL

## 2024-06-13 ENCOUNTER — TREATMENT (OUTPATIENT)
Dept: PHYSICAL THERAPY | Facility: CLINIC | Age: 44
End: 2024-06-13
Payer: COMMERCIAL

## 2024-06-13 DIAGNOSIS — M25.521 RIGHT ELBOW PAIN: ICD-10-CM

## 2024-06-13 DIAGNOSIS — M75.20 BICEPS TENDINITIS, UNSPECIFIED LATERALITY: Primary | ICD-10-CM

## 2024-06-13 PROCEDURE — 97110 THERAPEUTIC EXERCISES: CPT | Mod: GP | Performed by: PHYSICAL THERAPIST

## 2024-06-13 PROCEDURE — 97140 MANUAL THERAPY 1/> REGIONS: CPT | Mod: GP | Performed by: PHYSICAL THERAPIST

## 2024-06-13 ASSESSMENT — PAIN - FUNCTIONAL ASSESSMENT: PAIN_FUNCTIONAL_ASSESSMENT: 0-10

## 2024-06-13 ASSESSMENT — PAIN SCALES - GENERAL: PAINLEVEL_OUTOF10: 5 - MODERATE PAIN

## 2024-06-13 NOTE — LETTER
June 13, 2024    Braden Frederick PT  [address] [facility]  Rehab Services  [facility] [address]    Patient: Tee Laughlin   YOB: 1980   Date of Visit: 6/13/2024       Dear Tracy Carrillo MD  [address]   [facility], [address] [place] [id number]    The attached plan of care is being sent to you because your patient’s medical reimbursement requires that you certify the plan of care. Your signature is required to allow uninterrupted insurance coverage.      You may indicate your approval by signing below and faxing this form back to us at Dept Fax: [phone].    Please call Dept: [phone] with any questions or concerns.    Thank you for this referral,        Braden Frederick PT  [place] [address] [facility]  [address] [facility] [address]    Payer: Payor: [facility] / Plan: [facility] / Product Type: *No Product type* /                                                                         Date:     Dear Braden Frederick PT,     Re: Ms. Tee Laughlin, MRN:[id number]    I certify that I have reviewed the attached plan of care and it is medically necessary for Ms. Tee Laughlin (1980) who is under my care.          ______________________________________                    _________________  Provider name and credentials                                           Date and time                                                                                           Plan of Care 6/13/24   Effective from: 6/13/2024  Effective to: 9/11/2024    Plan ID: [id number]            Participants as of Finalize on 6/13/2024    Name Type Comments Contact Info    Braden Frederick PT Physical Therapist  [phone]    Tracy Carrillo MD Referring Provider  [phone]       Last Plan Note     Author: Braden Frederick PT Status: Sign when Signing Visit Last edited:  6/13/2024  3:15 PM       Physical Therapy    Physical Therapy Treatment    Patient Name: Tee Laughlin  MRN: [id number]  Today's Date: 6/13/2024  Time Calculation  Start Time: 1518  Stop Time: 1558  Time Calculation (min): 40 min  PT Therapeutic Procedures Time Entry  Manual Therapy Time Entry: 10  Therapeutic Exercise Time Entry: 20         Insurance:  [facility]  10% coinsurance; $0 copay  60 V/Y  PA not req  Visit: 7  POC: 8    Assessment:  Patient has completed 7 therapy visits up to this point, and have met 0/9 established therapy goals. The patient has shown improvements in strength and ROM of the shoulder and elbow. At this time, the patient remains below functional baseline with pain in the upper extremity, weakness of the upper extremity, painful elbow and wrist ROM, and increased muscular tension. These deficits impair the patient's ability to reach for objects, carry objects, sleep, and perform most recreation activities without pain or restriction. They would benefit from continued skilled therapy at this time to continue to promote functional gains and a return to prior level of function. PT performed exam for the wrist musculature due to recent MRI results, and goals updated. PT recommends an additional 4 visits to be performed following this appointment for a total of 11.        Plan:   OP PT Plan  Treatment/Interventions: Cryotherapy, Dry needling, Education/ Instruction, Electrical stimulation, Hot pack, Manual therapy, Neuromuscular re-education, Self care/ home management, Taping techniques, Therapeutic activities, Therapeutic exercises, Ultrasound, Vasopneumatic device  PT Plan: Skilled PT  PT Frequency: 1 time per week  Duration: 4 additional visits  Onset Date: 03/26/24  Certification Period Start Date: 06/13/24  Certification Period End Date: 09/11/24    Current Problem  1. Biceps tendinitis, unspecified laterality        2. Right elbow pain                  General     General  Reason for Referral:  "R biceps pain  Referred By: Dr. Tracy Carrillo  Past Medical History Relevant to Rehab: hx of hemochromostosis, back pain    Subjective    Patient reports that her arm continues to hurt at this time. She reports pain of 5/10 at the start of session. Pain continues to wake her up 4+ times/week. She is still restricted in her ability to perform activities at the gym    Precautions  Precautions  Precautions Comment: hx of hemochromostosis, back pain    Pain  Pain Assessment  Pain Assessment: 0-10  Pain Score: 5 - Moderate pain  Pain Location: Arm  Pain Orientation: Right    Objective   Elbow AROM (*indicates pain) L from IE   Flexion R 142, L 145  Extension R 2 hyper*, L 3 hyper  Supination R 82*, L 87  Pronation R 81, L 80      Shoulder strength (*indicates pain): L from IE  Flexion R 4+/5*, L 4+/5  Abd  R 4+/5*, L 4+/5  IR  R 4+/5, L 4+/5  ER R 4+/5, L 4+/5    Elbow strength (*indicates pain) L from IE  Flexion R 4+/5*, L 5/5  Extension R 4+/5*, L 5/5  Pronation R 4+/5, L 5/5  Supination R 4+/5, L 5/5    Wrist strength:   Flexion R 4/5*,   Extension R 4/5*,  RD  R 4+/5,   UD R 4+/5,     Wrist AROM:  Flexion R 86*,   Extension R 87*,  RD R 21,  UD R 38,    QuickDash: 42. 70.45%    Lifting: pt able to lift a 4lb weight onto a shelf above shoudler height 10x consecutively but increased pain 6/10 following    Carrying: held d/t increased pain with all activity of the upper extremity      Treatments:  Therapeutic exercises:  Obj measures and goals review   common extensors stretch 3x30\"   Flexor compartment stretch 3x30\"  Biceps stretch at wall 3x30\"      Manual:  Very light STM/ TPR to R middle and distal brachioradialis and biceps  KT to the R common extensors origin at elbow star (50%) and \"I\" strips to common extensors musc and medial epicondylar musc (0%). NT      OP EDUCATION:     Education regarding alt ice/ heat to the right elbow area to help increase healing.    Goals:  By discharge:  1. Patient will " report and demonstrate independence with established HEP ONGOING  2. Patient will report pain of 0/10 at rest, and no greater than 2/10 with any activity including lifting, carrying, reaching, and participating in recreation activities NOT MET  3. Patient will demonstrate the ability to lift a 4lb weight onto a shelf above shoulder height and back to waist height 10x consecutively and without an increase in pain greater than 2/10 to indicate improved ability to perform overhead lifting/reaching NOT MET  4. Patient will demonstrate gross shoulder strength of >/= 4+/5 to increase their ability to perform all daily and work tasks PROGRESSING  5. Patient will demonstrate a decrease in QuickDash score by 11 points to </=  20/55 to meet established MCID for the outcome measure (baseline 4/24/24 31/55) NOT MET  6. Patient will report the ability to sleep through the night 6/7 nights per week uninterrupted by pain to improve overall function throughout the day PARTIALLY MET  7. Patient will demonstrate the ability to perform a 20lb box carry 50' x2 without pain in the right upper extremity exceeding 2/10 to indicate improved ability to perform light activities within the home NOT ASSESSED  8. Patient will report the ability to return to all desired upper body exercises at the gym without any perceived restriction x2 weeks to indicate decreased tissue irritation and a return to normal activities NOT MET  9. Patient will demonstrate pain free and symmetrical AROM of the bilateral elbows (flexion,extension,supination,pronation) to improve her ability to lift, carry, and participate in recreation activities without restriction  PARTIALLY MET  10. Updated 6/13/24: Patient will demonstrate increased strength of the wrist flexors, extensions, RD, and UD to 4+/5 to improve her ability to perform all ADLs and recreation activities without restrictions          Current Participants as of 6/13/2024    Name Type Comments Contact Info     Braden Frederick, PT Physical Therapist  [phone]    Signature pending    Tracy Carrillo MD Referring Provider  [phone]

## 2024-06-13 NOTE — PROGRESS NOTES
Physical Therapy    Physical Therapy Treatment    Patient Name: Tee Laughlin  MRN: [id number]  Today's Date: 6/13/2024  Time Calculation  Start Time: 1518  Stop Time: 1558  Time Calculation (min): 40 min  PT Therapeutic Procedures Time Entry  Manual Therapy Time Entry: 10  Therapeutic Exercise Time Entry: 20         Insurance:  [facility]  10% coinsurance; $0 copay  60 V/Y  PA not req  Visit: 7  POC: 8    Assessment:  Patient has completed 7 therapy visits up to this point, and have met 0/9 established therapy goals. The patient has shown improvements in strength and ROM of the shoulder and elbow. At this time, the patient remains below functional baseline with pain in the upper extremity, weakness of the upper extremity, painful elbow and wrist ROM, and increased muscular tension. These deficits impair the patient's ability to reach for objects, carry objects, sleep, and perform most recreation activities without pain or restriction. They would benefit from continued skilled therapy at this time to continue to promote functional gains and a return to prior level of function. PT performed exam for the wrist musculature due to recent MRI results, and goals updated. PT recommends an additional 4 visits to be performed following this appointment for a total of 11.        Plan:   OP PT Plan  Treatment/Interventions: Cryotherapy, Dry needling, Education/ Instruction, Electrical stimulation, Hot pack, Manual therapy, Neuromuscular re-education, Self care/ home management, Taping techniques, Therapeutic activities, Therapeutic exercises, Ultrasound, Vasopneumatic device  PT Plan: Skilled PT  PT Frequency: 1 time per week  Duration: 4 additional visits  Onset Date: 03/26/24  Certification Period Start Date: 06/13/24  Certification Period End Date: 09/11/24    Current Problem  1. Biceps tendinitis, unspecified laterality        2. Right elbow pain                  General     General  Reason for Referral: R biceps pain  Referred  "By: [place]  Past Medical History Relevant to Rehab: hx of hemochromostosis, back pain    Subjective    Patient reports that her arm continues to hurt at this time. She reports pain of 5/10 at the start of session. Pain continues to wake her up 4+ times/week. She is still restricted in her ability to perform activities at the gym    Precautions  Precautions  Precautions Comment: hx of hemochromostosis, back pain    Pain  Pain Assessment  Pain Assessment: 0-10  Pain Score: 5 - Moderate pain  Pain Location: Arm  Pain Orientation: Right    Objective   Elbow AROM (*indicates pain) L from IE   Flexion R 142, L 145  Extension R 2 hyper*, L 3 hyper  Supination R 82*, L 87  Pronation R 81, L 80      Shoulder strength (*indicates pain): L from IE  Flexion R 4+/5*, L 4+/5  Abd  R 4+/5*, L 4+/5  IR  R 4+/5, L 4+/5  ER R 4+/5, L 4+/5    Elbow strength (*indicates pain) L from IE  Flexion R 4+/5*, L 5/5  Extension R 4+/5*, L 5/5  Pronation R 4+/5, L 5/5  Supination R 4+/5, L 5/5    Wrist strength:   Flexion R 4/5*,   Extension R 4/5*,  RD  R 4+/5,   UD R 4+/5,     Wrist AROM:  Flexion R 86*,   Extension R 87*,  RD R 21,  UD R 38,    QuickDash: 42. 70.45%    Lifting: pt able to lift a 4lb weight onto a shelf above shoudler height 10x consecutively but increased pain 6/10 following    Carrying: held d/t increased pain with all activity of the upper extremity      Treatments:  Therapeutic exercises:  Obj measures and goals review   common extensors stretch 3x30\"   Flexor compartment stretch 3x30\"  Biceps stretch at wall 3x30\"      Manual:  Very light STM/ TPR to R middle and distal brachioradialis and biceps  KT to the R common extensors origin at elbow star (50%) and \"I\" strips to common extensors musc and medial epicondylar musc (0%). NT      OP EDUCATION:     Education regarding alt ice/ heat to the right elbow area to help increase healing.    Goals:  By discharge:  1. Patient will report and demonstrate " independence with established HEP ONGOING  2. Patient will report pain of 0/10 at rest, and no greater than 2/10 with any activity including lifting, carrying, reaching, and participating in recreation activities NOT MET  3. Patient will demonstrate the ability to lift a 4lb weight onto a shelf above shoulder height and back to waist height 10x consecutively and without an increase in pain greater than 2/10 to indicate improved ability to perform overhead lifting/reaching NOT MET  4. Patient will demonstrate gross shoulder strength of >/= 4+/5 to increase their ability to perform all daily and work tasks PROGRESSING  5. Patient will demonstrate a decrease in QuickDash score by 11 points to </=  20/55 to meet established MCID for the outcome measure (baseline 4/24/24 31/55) NOT MET  6. Patient will report the ability to sleep through the night 6/7 nights per week uninterrupted by pain to improve overall function throughout the day PARTIALLY MET  7. Patient will demonstrate the ability to perform a 20lb box carry 50' x2 without pain in the right upper extremity exceeding 2/10 to indicate improved ability to perform light activities within the home NOT ASSESSED  8. Patient will report the ability to return to all desired upper body exercises at the gym without any perceived restriction x2 weeks to indicate decreased tissue irritation and a return to normal activities NOT MET  9. Patient will demonstrate pain free and symmetrical AROM of the bilateral elbows (flexion,extension,supination,pronation) to improve her ability to lift, carry, and participate in recreation activities without restriction  PARTIALLY MET  10. Updated 6/13/24: Patient will demonstrate increased strength of the wrist flexors, extensions, RD, and UD to 4+/5 to improve her ability to perform all ADLs and recreation activities without restrictions

## 2024-06-13 NOTE — LETTER
June 13, 2024    Braden Frederick PT  [address] [facility]  Rehab Services  [facility] [address]    Patient: Tee Laughlin   YOB: 1980   Date of Visit: 6/13/2024       Dear Tracy Carrillo MD  [address]   [facility], [address] [place] [address]    The attached plan of care is being sent to you because your patient’s medical reimbursement requires that you certify the plan of care. Your signature is required to allow uninterrupted insurance coverage.      You may indicate your approval by signing below and faxing this form back to us at Dept Fax: [phone].    Please call Dept: [phone] with any questions or concerns.    Thank you for this referral,        Braden Frederick PT  [place] [address] [facility]  [address] [facility] [address]    Payer: Payor: [facility] / Plan: [facility] / Product Type: *No Product type* /                                                                         Date:     Dear Braden Frederick PT,     Re: Ms. Tee Laughlin, MRN:[id number]    I certify that I have reviewed the attached plan of care and it is medically necessary for Ms. Tee Laughlin (1980) who is under my care.          ______________________________________                    _________________  Provider name and credentials                                           Date and time                                                                                           Plan of Care 6/13/24   Effective from: 6/13/2024  Effective to: 9/11/2024    Plan ID: [id number]            Participants as of Finalize on 6/13/2024    Name Type Comments Contact Info    Braden Frederick PT Physical Therapist  [phone]    Tracy Carrillo MD Referring Provider  [phone]       Last Plan Note     Author: Braden Frederick PT Status: Incomplete Last edited: 6/13/2024   3:15 PM       Physical Therapy    Physical Therapy Treatment    Patient Name: Tee Laughlin  MRN: [id number]  Today's Date: 6/13/2024  Time Calculation  Start Time: 1518  Stop Time: 1558  Time Calculation (min): 40 min  PT Therapeutic Procedures Time Entry  Manual Therapy Time Entry: 10  Therapeutic Exercise Time Entry: 20         Insurance:  [facility]  10% coinsurance; $0 copay  60 V/Y  PA not req  Visit: 7  POC: 8    Assessment:  Patient has completed 7 therapy visits up to this point, and have met 0/9 established therapy goals. The patient has shown improvements in strength and ROM of the shoulder and elbow. At this time, the patient remains below functional baseline with pain in the upper extremity, weakness of the upper extremity, painful elbow and wrist ROM, and increased muscular tension. These deficits impair the patient's ability to reach for objects, carry objects, sleep, and perform most recreation activities without pain or restriction. They would benefit from continued skilled therapy at this time to continue to promote functional gains and a return to prior level of function. PT performed exam for the wrist musculature due to recent MRI results, and goals updated. PT recommends an additional 4 visits to be performed following this appointment for a total of 11.        Plan:   OP PT Plan  Treatment/Interventions: Cryotherapy, Dry needling, Education/ Instruction, Electrical stimulation, Hot pack, Manual therapy, Neuromuscular re-education, Self care/ home management, Taping techniques, Therapeutic activities, Therapeutic exercises, Ultrasound, Vasopneumatic device  PT Plan: Skilled PT  PT Frequency: 1 time per week  Duration: 4 additional visits  Onset Date: 03/26/24  Certification Period Start Date: 06/13/24  Certification Period End Date: 09/11/24    Current Problem  1. Biceps tendinitis, unspecified laterality                  General     General  Reason for Referral: R biceps pain  Referred By: Dr. Molina  "Toy Carrillo  Past Medical History Relevant to Rehab: hx of hemochromostosis, back pain    Subjective   Patient reports that her arm continues to hurt at this time. She reports pain of 5/10 at the start of session. Pain continues to wake her up 4+ times/week. She is still restricted in her ability to perform activities at the gym    Precautions  Precautions  Precautions Comment: hx of hemochromostosis, back pain    Pain  Pain Assessment  Pain Assessment: 0-10  Pain Score: 5 - Moderate pain  Pain Location: Arm  Pain Orientation: Right    Objective  Elbow AROM (*indicates pain) L from IE   Flexion R 142, L 145  Extension R 2 hyper*, L 3 hyper  Supination R 82*, L 87  Pronation R 81, L 80      Shoulder strength (*indicates pain): L from IE  Flexion R 4+/5*, L 4+/5  Abd  R 4+/5*, L 4+/5  IR  R 4+/5, L 4+/5  ER R 4+/5, L 4+/5    Elbow strength (*indicates pain) L from IE  Flexion R 4+/5*, L 5/5  Extension R 4+/5*, L 5/5  Pronation R 4+/5, L 5/5  Supination R 4+/5, L 5/5    Wrist strength:   Flexion R 4/5*,   Extension R 4/5*,  RD  R 4+/5,   UD R 4+/5,     Wrist AROM:  Flexion R 86*,   Extension R 87*,  RD R 21,  UD R 38,    QuickDash: 42. 70.45%    Lifting: pt able to lift a 4lb weight onto a shelf above shoudler height 10x consecutively but increased pain 6/10 following    Carrying: held d/t increased pain with all activity of the upper extremity      Treatments:  Therapeutic exercises:  Obj measures and goals review   common extensors stretch 3x30\"   Flexor compartment stretch 3x30\"  Biceps stretch at wall 3x30\"      Manual:  Very light STM/ TPR to R middle and distal brachioradialis and biceps  KT to the R common extensors origin at elbow star (50%) and \"I\" strips to common extensors musc and medial epicondylar musc (0%). NT      OP EDUCATION:     Education regarding alt ice/ heat to the right elbow area to help increase healing.    Goals:  By discharge:  1. Patient will report and demonstrate independence with " established HEP ONGOING  2. Patient will report pain of 0/10 at rest, and no greater than 2/10 with any activity including lifting, carrying, reaching, and participating in recreation activities NOT MET  3. Patient will demonstrate the ability to lift a 4lb weight onto a shelf above shoulder height and back to waist height 10x consecutively and without an increase in pain greater than 2/10 to indicate improved ability to perform overhead lifting/reaching NOT MET  4. Patient will demonstrate gross shoulder strength of >/= 4+/5 to increase their ability to perform all daily and work tasks PROGRESSING  5. Patient will demonstrate a decrease in QuickDash score by 11 points to </=  20/55 to meet established MCID for the outcome measure (baseline 4/24/24 31/55) NOT MET  6. Patient will report the ability to sleep through the night 6/7 nights per week uninterrupted by pain to improve overall function throughout the day PARTIALLY MET  7. Patient will demonstrate the ability to perform a 20lb box carry 50' x2 without pain in the right upper extremity exceeding 2/10 to indicate improved ability to perform light activities within the home NOT ASSESSED  8. Patient will report the ability to return to all desired upper body exercises at the gym without any perceived restriction x2 weeks to indicate decreased tissue irritation and a return to normal activities NOT MET  9. Patient will demonstrate pain free and symmetrical AROM of the bilateral elbows (flexion,extension,supination,pronation) to improve her ability to lift, carry, and participate in recreation activities without restriction  PARTIALLY MET  10. Updated 6/13/24: Patient will demonstrate increased strength of the wrist flexors, extensions, RD, and UD to 4+/5 to improve her ability to perform all ADLs and recreation activities without restrictions          Current Participants as of 6/13/2024    Name Type Comments Contact Info    Braden Frederick, PT Physical Therapist   [phone]    Signature pending    Tracy Carrillo MD Referring Provider  [phone]

## 2024-06-19 ENCOUNTER — APPOINTMENT (OUTPATIENT)
Dept: PHYSICAL THERAPY | Facility: CLINIC | Age: 44
End: 2024-06-19
Payer: COMMERCIAL

## 2024-06-19 NOTE — PROGRESS NOTES
Physical Therapy    Physical Therapy Treatment    Patient Name: Tee Laughlin  MRN: [id number]  Today's Date: 6/19/2024               Insurance:  [facility]  10% coinsurance; $0 copay  60 V/Y  PA not req  Visit: 7  POC: 8    Assessment:  Patient has completed 7 therapy visits up to this point, and have met 0/9 established therapy goals. The patient has shown improvements in strength and ROM of the shoulder and elbow. At this time, the patient remains below functional baseline with pain in the upper extremity, weakness of the upper extremity, painful elbow and wrist ROM, and increased muscular tension. These deficits impair the patient's ability to reach for objects, carry objects, sleep, and perform most recreation activities without pain or restriction. They would benefit from continued skilled therapy at this time to continue to promote functional gains and a return to prior level of function. PT performed exam for the wrist musculature due to recent MRI results, and goals updated. PT recommends an additional 4 visits to be performed following this appointment for a total of 11.        Plan:        Current Problem  No diagnosis found.            General          Subjective    Patient reports that her arm continues to hurt at this time. She reports pain of 5/10 at the start of session. Pain continues to wake her up 4+ times/week. She is still restricted in her ability to perform activities at the gym    Precautions       Pain       Objective   Elbow AROM (*indicates pain) L from IE   Flexion R 142, L 145  Extension R 2 hyper*, L 3 hyper  Supination R 82*, L 87  Pronation R 81, L 80      Shoulder strength (*indicates pain): L from IE  Flexion R 4+/5*, L 4+/5  Abd  R 4+/5*, L 4+/5  IR  R 4+/5, L 4+/5  ER R 4+/5, L 4+/5    Elbow strength (*indicates pain) L from IE  Flexion R 4+/5*, L 5/5  Extension R 4+/5*, L 5/5  Pronation R 4+/5, L 5/5  Supination R 4+/5, L 5/5    Wrist strength:   Flexion R 4/5*,   Extension R 4/5*,  RD  " R 4+/5,   UD R 4+/5,     Wrist AROM:  Flexion R 86*,   Extension R 87*,  RD R 21,  UD R 38,    QuickDash: 42. 70.45%    Lifting: pt able to lift a 4lb weight onto a shelf above shoudler height 10x consecutively but increased pain 6/10 following    Carrying: held d/t increased pain with all activity of the upper extremity      Treatments:  Therapeutic exercises:   common extensors stretch 3x30\"   Flexor compartment stretch 3x30\"  Biceps stretch at wall 3x30\"      Manual:  Very light STM/ TPR to R middle and distal brachioradialis and biceps  KT to the R common extensors origin at elbow star (50%) and \"I\" strips to common extensors musc and medial epicondylar musc (0%). NT      OP EDUCATION:     Education regarding alt ice/ heat to the right elbow area to help increase healing.    Goals:  By discharge:  1. Patient will report and demonstrate independence with established HEP ONGOING  2. Patient will report pain of 0/10 at rest, and no greater than 2/10 with any activity including lifting, carrying, reaching, and participating in recreation activities NOT MET  3. Patient will demonstrate the ability to lift a 4lb weight onto a shelf above shoulder height and back to waist height 10x consecutively and without an increase in pain greater than 2/10 to indicate improved ability to perform overhead lifting/reaching NOT MET  4. Patient will demonstrate gross shoulder strength of >/= 4+/5 to increase their ability to perform all daily and work tasks PROGRESSING  5. Patient will demonstrate a decrease in QuickDash score by 11 points to </=  20/55 to meet established MCID for the outcome measure (baseline 4/24/24 31/55) NOT MET  6. Patient will report the ability to sleep through the night 6/7 nights per week uninterrupted by pain to improve overall function throughout the day PARTIALLY MET  7. Patient will demonstrate the ability to perform a 20lb box carry 50' x2 without pain in the right upper extremity exceeding 2/10 to " indicate improved ability to perform light activities within the home NOT ASSESSED  8. Patient will report the ability to return to all desired upper body exercises at the gym without any perceived restriction x2 weeks to indicate decreased tissue irritation and a return to normal activities NOT MET  9. Patient will demonstrate pain free and symmetrical AROM of the bilateral elbows (flexion,extension,supination,pronation) to improve her ability to lift, carry, and participate in recreation activities without restriction  PARTIALLY MET  10. Updated 6/13/24: Patient will demonstrate increased strength of the wrist flexors, extensions, RD, and UD to 4+/5 to improve her ability to perform all ADLs and recreation activities without restrictions

## 2024-06-26 ENCOUNTER — TREATMENT (OUTPATIENT)
Dept: PHYSICAL THERAPY | Facility: CLINIC | Age: 44
End: 2024-06-26
Payer: COMMERCIAL

## 2024-06-26 DIAGNOSIS — M25.521 RIGHT ELBOW PAIN: ICD-10-CM

## 2024-06-26 DIAGNOSIS — M75.20 BICEPS TENDINITIS, UNSPECIFIED LATERALITY: Primary | ICD-10-CM

## 2024-06-26 PROCEDURE — 97035 APP MDLTY 1+ULTRASOUND EA 15: CPT | Mod: GP,CQ

## 2024-06-26 PROCEDURE — 97140 MANUAL THERAPY 1/> REGIONS: CPT | Mod: GP,CQ

## 2024-06-26 PROCEDURE — 97110 THERAPEUTIC EXERCISES: CPT | Mod: GP,CQ

## 2024-06-26 ASSESSMENT — PAIN - FUNCTIONAL ASSESSMENT: PAIN_FUNCTIONAL_ASSESSMENT: 0-10

## 2024-06-26 ASSESSMENT — PAIN SCALES - GENERAL: PAINLEVEL_OUTOF10: 3

## 2024-06-26 NOTE — PROGRESS NOTES
"Physical Therapy    Physical Therapy Treatment    Patient Name: Tee Laughlin  MRN: [id number]  Today's Date: 6/26/2024  Time Calculation  Start Time: 0100  Stop Time: 0200  Time Calculation (min): 60 min  PT Therapeutic Procedures Time Entry  Manual Therapy Time Entry: 20  Therapeutic Exercise Time Entry: 25  PT Modalities Time Entry  Ultrasound Time Entry: 12      Insurance:  [facility]  10% coinsurance; $0 copay  60 V/Y  PA not req  Visit: 8  POC: 11    Assessment: Decreased muscle tightness noted after manual.  No skin abnormality noted after modalities.  Pt aj eccentric strengthening w/o increased pain, just notes muscle fatigue. Pt reports \"it feels a little better\" after tx. Patient would benefit from P.T. to continue to address impairments in order to improve strength, flexibility, posture, and  to decrease symptoms and increase overall function.        Plan:   OP PT Plan  Treatment/Interventions: Cryotherapy, Dry needling, Education/ Instruction, Electrical stimulation, Hot pack, Manual therapy, Neuromuscular re-education, Self care/ home management, Taping techniques, Therapeutic activities, Therapeutic exercises, Ultrasound, Vasopneumatic device  PT Plan: Skilled PT  PT Frequency: 1 time per week  Duration: 4 additional visits  Onset Date: 03/26/24  Certification Period Start Date: 06/13/24  Certification Period End Date: 09/11/24    Current Problem  1. Biceps tendinitis, unspecified laterality        2. Right elbow pain              General     General  Reason for Referral: R biceps pain  Referred By: Dr. Tracy Carrillo  Past Medical History Relevant to Rehab: hx of hemochromostosis, back pain    Subjective    Patient reports that her arm feels a little better. She reports pain of 2-3/10 at the start of session. Pain continues to wake her up after she uses the arm a little too much. Notes working out a little ~ 3 days/ wk but then sore/ increased pain for ~ 1-2 days generally. Reports not " "being able to brush her hair for long and then the pain increases.    Precautions  Precautions  Precautions Comment: hx of hemochromostosis, back pain    Pain  Pain Assessment  Pain Assessment: 0-10  0-10 (Numeric) Pain Score: 3    Objective         Treatments:  Therapeutic exercises:  UBE f/R L2 x2'ea alt  Common extensors stretch 3x30\"   Flexor compartment stretch 3x30\"  Biceps stretch at wall 3x30\"  ECC wrist flex and ext strengthening 1# 2x10ea  biceps 2 way (ECC) 1# 2x10ea   triceps 3 ext 3way BTT x15ea  LAE NV    Manual:  Very light STM/ TPR to R middle and distal brachioradialis and biceps  KT to the R common extensors origin at elbow star (50%) and \"I\" strips to common extensors musc and medial epicondylar musc (0%). NT    Modalities:  US at 1.2w/cm2 at 1MHz at 100% to R common extensors origin at lat epicondyle x12'.     OP EDUCATION:   Education regarding alt ice/ heat to the right elbow area to help increase healing.    6/26/24: [email or web address]  Access Code: [email or web address]: wrist flex and ext (ECC) 1#, biceps 2 way (ECC) 1#, triceps 3 ext 3way BTB.    Goals:  By discharge:  1. Patient will report and demonstrate independence with established HEP ONGOING  2. Patient will report pain of 0/10 at rest, and no greater than 2/10 with any activity including lifting, carrying, reaching, and participating in recreation activities NOT MET  3. Patient will demonstrate the ability to lift a 4lb weight onto a shelf above shoulder height and back to waist height 10x consecutively and without an increase in pain greater than 2/10 to indicate improved ability to perform overhead lifting/reaching NOT MET  4. Patient will demonstrate gross shoulder strength of >/= 4+/5 to increase their ability to perform all daily and work tasks PROGRESSING  5. Patient will demonstrate a decrease in QuickDash score by 11 points to </=  20/55 to meet established MCID for the outcome measure (baseline 4/24/24 31/55) NOT " MET  6. Patient will report the ability to sleep through the night 6/7 nights per week uninterrupted by pain to improve overall function throughout the day PARTIALLY MET  7. Patient will demonstrate the ability to perform a 20lb box carry 50' x2 without pain in the right upper extremity exceeding 2/10 to indicate improved ability to perform light activities within the home NOT ASSESSED  8. Patient will report the ability to return to all desired upper body exercises at the gym without any perceived restriction x2 weeks to indicate decreased tissue irritation and a return to normal activities NOT MET  9. Patient will demonstrate pain free and symmetrical AROM of the bilateral elbows (flexion,extension,supination,pronation) to improve her ability to lift, carry, and participate in recreation activities without restriction  PARTIALLY MET  10. Updated 6/13/24: Patient will demonstrate increased strength of the wrist flexors, extensions, RD, and UD to 4+/5 to improve her ability to perform all ADLs and recreation activities without restrictions

## 2024-07-02 ENCOUNTER — TREATMENT (OUTPATIENT)
Dept: PHYSICAL THERAPY | Facility: CLINIC | Age: 44
End: 2024-07-02
Payer: COMMERCIAL

## 2024-07-02 DIAGNOSIS — M75.20 BICEPS TENDINITIS, UNSPECIFIED LATERALITY: Primary | ICD-10-CM

## 2024-07-02 DIAGNOSIS — M25.521 RIGHT ELBOW PAIN: ICD-10-CM

## 2024-07-02 PROCEDURE — 97140 MANUAL THERAPY 1/> REGIONS: CPT | Mod: GP | Performed by: PHYSICAL THERAPIST

## 2024-07-02 PROCEDURE — 97110 THERAPEUTIC EXERCISES: CPT | Mod: GP | Performed by: PHYSICAL THERAPIST

## 2024-07-02 ASSESSMENT — PAIN - FUNCTIONAL ASSESSMENT: PAIN_FUNCTIONAL_ASSESSMENT: 0-10

## 2024-07-02 ASSESSMENT — PAIN SCALES - GENERAL: PAINLEVEL_OUTOF10: 3

## 2024-07-02 NOTE — PROGRESS NOTES
Physical Therapy    Physical Therapy Treatment    Patient Name: Tee Laughlin  MRN: [id number]  Today's Date: 7/2/2024  Time Calculation  Start Time: 1605  Stop Time: 1649  Time Calculation (min): 44 min  PT Therapeutic Procedures Time Entry  Manual Therapy Time Entry: 15  Therapeutic Exercise Time Entry: 25         Insurance:  [facility]  10% coinsurance; $0 copay  60 V/Y  PA not req  Visit: 9  POC: 11    Assessment:   Treatment session focuses on therapeutic exercise and manual therapy to improve tissue mobility, flexibility, strength, and stability of the right elbow, wrist, and upper arm. Patient reports increased tenderness to the right elbow and forearm during manual therapy this date, and reports increased soreness following treatment intervention. Therapeutic exercises tolerated well, and patient is encouraged to continue to work on the gentle stretches and strengthening within pain tolerance. Pain reported as 0/10 at end of session. She remains below her baseline function, and would benefit from additional skilled PT at this time.     Plan:   OP PT Plan  PT Plan: Skilled PT    Current Problem  1. Biceps tendinitis, unspecified laterality        2. Right elbow pain                General     General  Reason for Referral: R biceps pain  Referred By: Dr. Tracy Carrillo  Past Medical History Relevant to Rehab: hx of hemochromostosis, back pain    Subjective    Patient reports that her bicep pain seems to be improving at this time, but she has been dealing with increased elbow pain over the past week or so. She did notice some increased soreness in the elbow following last session but it returned to baseline within 24 hours.     Precautions  Precautions  Precautions Comment: hx of hemochromostosis, back pain    Pain  Pain Assessment  Pain Assessment: 0-10  0-10 (Numeric) Pain Score: 3  Pain Location: Elbow  Pain Orientation: Right    Objective   Increased tension to the proximal extensor compartment on the  "right      Treatments:  Therapeutic exercises:  UBE f/R L2 x2'ea alt  Common extensors stretch 3x30\"   Flexor compartment stretch 3x30\"  Biceps stretch at wall 3x30\"  ECC wrist flex and ext strengthening 1# 2x10ea  biceps 2 way (ECC) 1# 2x10ea   triceps 3 ext 3way BTT x15ea NT  LAE GTT 2x10  (25')    Manual:  STM to common extensor tendon and extensor compartment on the R  Passive extensor compartment stretches  (15')      OP EDUCATION:   Education regarding alt ice/ heat to the right elbow area to help increase healing.    6/26/24: [email or web address]  Access Code: [email or web address]: wrist flex and ext (ECC) 1#, biceps 2 way (ECC) 1#, triceps 3 ext 3way BTB.    Goals:  By discharge:  1. Patient will report and demonstrate independence with established HEP ONGOING  2. Patient will report pain of 0/10 at rest, and no greater than 2/10 with any activity including lifting, carrying, reaching, and participating in recreation activities NOT MET  3. Patient will demonstrate the ability to lift a 4lb weight onto a shelf above shoulder height and back to waist height 10x consecutively and without an increase in pain greater than 2/10 to indicate improved ability to perform overhead lifting/reaching NOT MET  4. Patient will demonstrate gross shoulder strength of >/= 4+/5 to increase their ability to perform all daily and work tasks PROGRESSING  5. Patient will demonstrate a decrease in QuickDash score by 11 points to </=  20/55 to meet established MCID for the outcome measure (baseline 4/24/24 31/55) NOT MET  6. Patient will report the ability to sleep through the night 6/7 nights per week uninterrupted by pain to improve overall function throughout the day PARTIALLY MET  7. Patient will demonstrate the ability to perform a 20lb box carry 50' x2 without pain in the right upper extremity exceeding 2/10 to indicate improved ability to perform light activities within the home NOT ASSESSED  8. Patient will report the " ability to return to all desired upper body exercises at the gym without any perceived restriction x2 weeks to indicate decreased tissue irritation and a return to normal activities NOT MET  9. Patient will demonstrate pain free and symmetrical AROM of the bilateral elbows (flexion,extension,supination,pronation) to improve her ability to lift, carry, and participate in recreation activities without restriction  PARTIALLY MET  10. Updated 6/13/24: Patient will demonstrate increased strength of the wrist flexors, extensions, RD, and UD to 4+/5 to improve her ability to perform all ADLs and recreation activities without restrictions

## 2024-07-17 ENCOUNTER — TREATMENT (OUTPATIENT)
Dept: PHYSICAL THERAPY | Facility: CLINIC | Age: 44
End: 2024-07-17
Payer: COMMERCIAL

## 2024-07-17 DIAGNOSIS — M25.521 RIGHT ELBOW PAIN: ICD-10-CM

## 2024-07-17 DIAGNOSIS — M75.20 BICEPS TENDINITIS, UNSPECIFIED LATERALITY: Primary | ICD-10-CM

## 2024-07-17 PROCEDURE — 97140 MANUAL THERAPY 1/> REGIONS: CPT | Mod: GP | Performed by: PHYSICAL THERAPIST

## 2024-07-17 PROCEDURE — 97110 THERAPEUTIC EXERCISES: CPT | Mod: GP | Performed by: PHYSICAL THERAPIST

## 2024-07-17 ASSESSMENT — PAIN - FUNCTIONAL ASSESSMENT: PAIN_FUNCTIONAL_ASSESSMENT: 0-10

## 2024-07-17 ASSESSMENT — PAIN SCALES - GENERAL: PAINLEVEL_OUTOF10: 3

## 2024-07-26 ENCOUNTER — APPOINTMENT (OUTPATIENT)
Dept: ORTHOPEDIC SURGERY | Facility: CLINIC | Age: 44
End: 2024-07-26
Payer: COMMERCIAL

## 2024-08-05 ENCOUNTER — HOSPITAL ENCOUNTER (OUTPATIENT)
Dept: RADIOLOGY | Facility: EXTERNAL LOCATION | Age: 44
Discharge: HOME | End: 2024-08-05

## 2024-08-05 ENCOUNTER — OFFICE VISIT (OUTPATIENT)
Dept: ORTHOPEDIC SURGERY | Facility: CLINIC | Age: 44
End: 2024-08-05
Payer: COMMERCIAL

## 2024-08-05 DIAGNOSIS — M25.521 RIGHT ELBOW PAIN: ICD-10-CM

## 2024-08-05 PROCEDURE — 20606 DRAIN/INJ JOINT/BURSA W/US: CPT | Performed by: FAMILY MEDICINE

## 2024-08-05 PROCEDURE — 99214 OFFICE O/P EST MOD 30 MIN: CPT | Performed by: FAMILY MEDICINE

## 2024-08-05 RX ORDER — LIDOCAINE HYDROCHLORIDE 10 MG/ML
2 INJECTION INFILTRATION; PERINEURAL
Status: COMPLETED | OUTPATIENT
Start: 2024-08-05 | End: 2024-08-05

## 2024-08-05 RX ORDER — TRIAMCINOLONE ACETONIDE 40 MG/ML
40 INJECTION, SUSPENSION INTRA-ARTICULAR; INTRAMUSCULAR
Status: COMPLETED | OUTPATIENT
Start: 2024-08-05 | End: 2024-08-05

## 2024-08-05 NOTE — PROGRESS NOTES
Established Patient Follow-Up Visit    CC:   Chief Complaint   Patient presents with    Right Elbow - Follow-up, Pain     want cortisone inj. today       HPI:  Tee is a 43 y.o. female returns here today for follow-up visit regarding: Posterior lateral right elbow pain.  She states she recently had an exam and evaluation with Dr. Tracy Yeager regarding her chronic elbow discomfort.  She states really no pain or discomfort of the biceps tendon today.  She is more interested in a posterior elbow injection.  She also has some mild pain at the radial head she is interested in the posterior elbow shot first here today.          REVIEW OF SYSTEMS:  GENERAL: Negative for malaise, significant weight loss, fever  MUSCULOSKELETAL: See HPI  NEURO: Negative for numbness / tingling       PHYSICAL EXAM:  -Neuro: Gross sensation intact to the upper extremities bilaterally.  -Extremity: Right elbow demonstrates skin which is warm pink well-perfused no open cuts wounds or sores full flexion extension about the elbow tenderness at the posterior lateral elbow joint there is no olecranon pain no medial or lateral epicondyle pain mild discomfort at the radial head no obvious crepitus seen.  Forearm compartment soft compressible distal biceps is intact and nontender 5-5 biceps flexion strength.    IMAGING: Previous images reviewed as below  Point of Care Ultrasound  These images are not reportable by radiology and will not be interpreted   by  Radiologists.      PROCEDURE:    M Inj/Asp: R elbow on 8/5/2024 9:45 AM  Indications: pain  Details: 25 G needle, ultrasound-guided posterolateral approach  Medications: 2 mL lidocaine 10 mg/mL (1 %); 40 mg triamcinolone acetonide 40 mg/mL  Outcome: tolerated well, no immediate complications  Procedure, treatment alternatives, risks and benefits explained, specific risks discussed. Consent was given by the patient. Immediately prior to procedure a time out was called to verify the correct  patient, procedure, equipment, support staff and site/side marked as required. Patient was prepped and draped in the usual sterile fashion.            ASSESSMENT:   Follow-up visit for:  Problem List Items Addressed This Visit       Right elbow pain    Relevant Orders    Point of Care Ultrasound (Completed)        PLAN: Patient tolerated the intra-articular elbow injection from a lateral approach posteriorly quite well.  She had modest immediate symptomatic relief upon discharge.  Will see her back as needed going forward we can consider potential radial head injection or lateral epicondyle injection if necessary for any ongoing pain or discomfort.  Recommend mended ice intermittently for the next 24 to 48 hours followed by increased range of motion and strength activities as tolerated going forward at that time.  Routine postprocedural precautions were provided.  Orders Placed This Encounter    M Inj/Asp    Point of Care Ultrasound           At the conclusion of the visit there were no further questions by the patient/family regarding their plan of care.  Patient was instructed to call or return with any issues, questions, or concerns regarding their injury and/or treatment plan described above.     08/05/24 at 9:56 AM - Cole C Budinsky, MD    Office: [phone]    This note was prepared using voice recognition software.  The details of this note are correct and have been reviewed, and corrected to the best of my ability.  Some grammatical errors may persist related to the Dragon software.

## 2024-08-29 ENCOUNTER — APPOINTMENT (OUTPATIENT)
Dept: ORTHOPEDIC SURGERY | Facility: CLINIC | Age: 44
End: 2024-08-29
Payer: COMMERCIAL

## 2024-10-23 ENCOUNTER — OFFICE VISIT (OUTPATIENT)
Dept: PRIMARY CARE | Facility: CLINIC | Age: 44
End: 2024-10-23
Payer: COMMERCIAL

## 2024-10-23 VITALS
SYSTOLIC BLOOD PRESSURE: 96 MMHG | HEIGHT: 61 IN | OXYGEN SATURATION: 98 % | DIASTOLIC BLOOD PRESSURE: 62 MMHG | BODY MASS INDEX: 22.62 KG/M2 | HEART RATE: 70 BPM | RESPIRATION RATE: 16 BRPM | WEIGHT: 119.8 LBS | TEMPERATURE: 97.9 F

## 2024-10-23 DIAGNOSIS — R53.83 FATIGUE, UNSPECIFIED TYPE: ICD-10-CM

## 2024-10-23 DIAGNOSIS — J02.9 SORE THROAT: ICD-10-CM

## 2024-10-23 DIAGNOSIS — J06.9 UPPER RESPIRATORY TRACT INFECTION, UNSPECIFIED TYPE: ICD-10-CM

## 2024-10-23 PROCEDURE — 99213 OFFICE O/P EST LOW 20 MIN: CPT | Performed by: NURSE PRACTITIONER

## 2024-10-23 PROCEDURE — 87804 INFLUENZA ASSAY W/OPTIC: CPT | Performed by: NURSE PRACTITIONER

## 2024-10-23 PROCEDURE — 3008F BODY MASS INDEX DOCD: CPT | Performed by: NURSE PRACTITIONER

## 2024-10-23 RX ORDER — AZITHROMYCIN 250 MG/1
TABLET, FILM COATED ORAL
Qty: 6 TABLET | Refills: 0 | Status: SHIPPED | OUTPATIENT
Start: 2024-10-23 | End: 2024-10-28

## 2024-10-23 ASSESSMENT — ENCOUNTER SYMPTOMS
MYALGIAS: 1
SINUS PRESSURE: 0
ARTHRALGIAS: 0
FEVER: 0
APPETITE CHANGE: 0
ACTIVITY CHANGE: 0
SORE THROAT: 1
CONSTIPATION: 0
SHORTNESS OF BREATH: 0
ABDOMINAL PAIN: 0
VOMITING: 0
NECK STIFFNESS: 0
SWOLLEN GLANDS: 1
FATIGUE: 1
EYE REDNESS: 0
SINUS PAIN: 0
COUGH: 1
NAUSEA: 0
CHILLS: 0
DIAPHORESIS: 0
DIARRHEA: 0
RHINORRHEA: 0
EYE DISCHARGE: 0
WHEEZING: 0
CHEST TIGHTNESS: 0
TROUBLE SWALLOWING: 1
PALPITATIONS: 0
HEADACHES: 1

## 2024-10-23 NOTE — ASSESSMENT & PLAN NOTE
URI symptoms x 1 week following illness of her daughter with similar symptoms.  Home COVID test negative.  Negative for influenza A and B in the office.  Will treat with azithromycin.  Continue supportive care with OTC medications as needed, rest, fluids. Let us know if symptoms persist or fail to completely resolve to baseline with current treatment. Verbalized understanding.   Orders:    POCT Influenza A/B manually resulted    azithromycin (Zithromax) 250 mg tablet; Take 2 tablets (500 mg) by mouth once daily for 1 day, THEN 1 tablet (250 mg) once daily for 4 days. Take 2 tabs (500 mg) by mouth today, than 1 daily for 4 days..

## 2024-10-23 NOTE — PROGRESS NOTES
"Subjective   Patient ID: Tee Laughlin is a 44 y.o. female who presents for URI.    URI   This is a new problem. The current episode started in the past 7 days. The problem has been unchanged. There has been no fever. Associated symptoms include congestion, coughing (yellow phlegm in AM only), headaches, a sore throat and swollen glands. Pertinent negatives include no abdominal pain, chest pain, diarrhea, ear pain, nausea, rash, rhinorrhea, sinus pain, sneezing, vomiting or wheezing. She has tried NSAIDs for the symptoms. The treatment provided mild relief.   One daughter was ill 10 days ago, one daughter ill now. Home covid test negative today.    No other concern/question.    The patient's allergies, medications, and past medical/surgical/family history were reviewed with them today and updated as indicated.    Review of Systems   Constitutional:  Positive for fatigue. Negative for activity change, appetite change, chills, diaphoresis and fever.   HENT:  Positive for congestion, postnasal drip, sore throat and trouble swallowing (painful but can do without difficulty). Negative for ear pain, rhinorrhea, sinus pressure, sinus pain and sneezing.    Eyes:  Negative for discharge and redness.   Respiratory:  Positive for cough (yellow phlegm in AM only). Negative for chest tightness, shortness of breath and wheezing.    Cardiovascular:  Negative for chest pain, palpitations and leg swelling.   Gastrointestinal:  Negative for abdominal pain, constipation, diarrhea, nausea and vomiting.   Musculoskeletal:  Positive for myalgias. Negative for arthralgias and neck stiffness.   Skin:  Negative for rash.   Neurological:  Positive for headaches.      Objective   Vital Signs: BP 96/62 (BP Location: Left arm, Patient Position: Sitting, BP Cuff Size: Adult)   Pulse 70   Temp 36.6 °C (97.9 °F) (Temporal)   Resp 16   Ht 1.549 m (5' 1\")   Wt 54.3 kg (119 lb 12.8 oz)   SpO2 98%   BMI 22.64 kg/m²     Physical Exam  Vitals " and nursing note reviewed.   Constitutional:       General: She is not in acute distress.     Appearance: Normal appearance. She is not ill-appearing.   HENT:      Head: Normocephalic and atraumatic.      Right Ear: Ear canal and external ear normal. No drainage or swelling. A middle ear effusion is present. No mastoid tenderness. Tympanic membrane is not erythematous, retracted or bulging.      Left Ear: Ear canal and external ear normal. No drainage or swelling. A middle ear effusion is present. No mastoid tenderness. Tympanic membrane is not erythematous, retracted or bulging.      Nose: No congestion or rhinorrhea.      Right Sinus: Maxillary sinus tenderness present. No frontal sinus tenderness.      Left Sinus: Maxillary sinus tenderness present. No frontal sinus tenderness.      Mouth/Throat:      Mouth: Mucous membranes are moist.      Tongue: No lesions.      Palate: No mass and lesions.      Pharynx: Uvula midline. Posterior oropharyngeal erythema and postnasal drip present. No oropharyngeal exudate or uvula swelling.      Tonsils: No tonsillar exudate or tonsillar abscesses.   Eyes:      General:         Right eye: No discharge.         Left eye: No discharge.      Extraocular Movements: Extraocular movements intact.      Conjunctiva/sclera: Conjunctivae normal.      Pupils: Pupils are equal, round, and reactive to light.   Cardiovascular:      Rate and Rhythm: Normal rate and regular rhythm.      Heart sounds: No murmur heard.  Pulmonary:      Effort: Pulmonary effort is normal. No respiratory distress.   Musculoskeletal:      Right lower leg: No edema.      Left lower leg: No edema.   Skin:     General: Skin is warm and dry.      Coloration: Skin is not jaundiced.      Findings: No erythema or rash.   Neurological:      General: No focal deficit present.      Mental Status: She is alert. Mental status is at baseline.   Psychiatric:         Mood and Affect: Mood normal.         Behavior: Behavior normal.          Thought Content: Thought content normal.        POCT today:   Results for orders placed or performed in visit on 10/23/24   POCT Influenza A/B manually resulted   Result Value Ref Range    POC Rapid Influenza A Negative Negative    POC Rapid Influenza B Negative Negative        Assessment & Plan  Upper respiratory tract infection, unspecified type  URI symptoms x 1 week following illness of her daughter with similar symptoms.  Home COVID test negative.  Negative for influenza A and B in the office.  Will treat with azithromycin.  Continue supportive care with OTC medications as needed, rest, fluids. Let us know if symptoms persist or fail to completely resolve to baseline with current treatment. Verbalized understanding.   Orders:    POCT Influenza A/B manually resulted    azithromycin (Zithromax) 250 mg tablet; Take 2 tablets (500 mg) by mouth once daily for 1 day, THEN 1 tablet (250 mg) once daily for 4 days. Take 2 tabs (500 mg) by mouth today, than 1 daily for 4 days..    Sore throat  See above.    Fatigue, unspecified type  See above.  BMI 22.0-22.9, adult    Reviewed treatment options and health maintenance. Take medications as prescribed. We will contact you with the results of any ordered testing; please send a Xeros message or call the office if you do not hear from us. Follow up in the office as discussed. Return sooner if symptoms do not resolve as expected.     Fatou Olivo, APRN-CNP, DNP, CCRN  Family Nurse Practitioner  [facility]

## 2024-10-25 LAB
POC RAPID INFLUENZA A: NEGATIVE
POC RAPID INFLUENZA B: NEGATIVE

## 2024-10-30 PROBLEM — R11.2 NAUSEA AND VOMITING: Status: RESOLVED | Noted: 2023-07-05 | Resolved: 2024-10-30

## 2024-10-30 PROBLEM — M75.20 BICEPS TENDINITIS: Status: RESOLVED | Noted: 2024-05-01 | Resolved: 2024-10-30

## 2024-10-30 PROBLEM — M25.521 RIGHT ELBOW PAIN: Status: RESOLVED | Noted: 2024-06-26 | Resolved: 2024-10-30

## 2024-10-30 PROBLEM — J02.9 SORE THROAT: Status: RESOLVED | Noted: 2024-10-23 | Resolved: 2024-10-30

## 2024-10-30 PROBLEM — J06.9 UPPER RESPIRATORY TRACT INFECTION: Status: RESOLVED | Noted: 2024-10-23 | Resolved: 2024-10-30

## 2024-12-09 DIAGNOSIS — B00.1 COLD SORE: ICD-10-CM

## 2024-12-09 RX ORDER — VALACYCLOVIR HYDROCHLORIDE 500 MG/1
TABLET, FILM COATED ORAL
Qty: 14 TABLET | Refills: 1 | Status: SHIPPED | OUTPATIENT
Start: 2024-12-09

## 2025-01-06 ENCOUNTER — OFFICE VISIT (OUTPATIENT)
Dept: PRIMARY CARE | Facility: CLINIC | Age: 45
End: 2025-01-06
Payer: COMMERCIAL

## 2025-01-06 VITALS
SYSTOLIC BLOOD PRESSURE: 100 MMHG | HEIGHT: 61 IN | RESPIRATION RATE: 18 BRPM | BODY MASS INDEX: 22.66 KG/M2 | WEIGHT: 120 LBS | DIASTOLIC BLOOD PRESSURE: 60 MMHG

## 2025-01-06 DIAGNOSIS — R09.89 CHEST CONGESTION: ICD-10-CM

## 2025-01-06 DIAGNOSIS — J01.90 ACUTE NON-RECURRENT SINUSITIS, UNSPECIFIED LOCATION: Primary | ICD-10-CM

## 2025-01-06 DIAGNOSIS — R09.81 HEAD CONGESTION: ICD-10-CM

## 2025-01-06 PROCEDURE — 99213 OFFICE O/P EST LOW 20 MIN: CPT | Performed by: FAMILY MEDICINE

## 2025-01-06 PROCEDURE — 1036F TOBACCO NON-USER: CPT | Performed by: FAMILY MEDICINE

## 2025-01-06 PROCEDURE — 3008F BODY MASS INDEX DOCD: CPT | Performed by: FAMILY MEDICINE

## 2025-01-06 RX ORDER — AZITHROMYCIN 250 MG/1
TABLET, FILM COATED ORAL
Qty: 6 TABLET | Refills: 0 | Status: SHIPPED | OUTPATIENT
Start: 2025-01-06 | End: 2025-01-10

## 2025-01-06 NOTE — PROGRESS NOTES
"Subjective   Patient ID: Tee Laughlin is a 44 y.o. female who presents for head congestion and chest congestion.  HPI    Patient presents in office today for cold symptoms. Ongoing x 5 days. Patient admits to head and chest congestion, cough. Patient has tried dayquil, mucinex OTC. Patient did tested for covid, was negative on 1/4/25. Patient admits that the head congestion has cleared up.     Taking current medications which were reviewed.  Problem list discussed.    Overall doing well.  Eating okay.  Staying active.    Has no other new problem /question.     ROS  Constitutional- No activity change. No appetite change.  Eyes- Denies vision changes.  Respiratory- congestion  Cardiovascular- No palpitations. No chest pain.  GI- No nausea or vomiting. No diarrhea or constipation. Denies abdominal pain.  Musculoskeletal- Denies joint swelling.  Neurological- Denies headaches. Denies dizziness.  Skin- No rashes.    Objective     /60   Resp 18   Ht 1.549 m (5' 1\")   Wt 54.4 kg (120 lb)   BMI 22.67 kg/m²     No Known Allergies    Constitutional-- Well-nourished.  No distress  Head- unremarkable.  Ears- TMs clear.  Eyes- PERRL.  Conjunctiva normal.  Nose- significant for clear rhinorrhea and mild bilateral maxillary sinus pressure  Throat- Oropharynx is mildly inflamed  Neck- Supple with no thyromegaly.  Mild anterior cervical adenopathy noted.  Pulmonary/Chest- Breath sounds with normal effort.  Trace upper airway rhonchi  Heart- Regular rate and rhythm.  No murmur.  Abdomen- Soft and non-tender.  No masses noted.  Extremities- no edema.    Assessment/Plan   1. Acute non-recurrent sinusitis, unspecified location  azithromycin (Zithromax) 250 mg tablet      2. Head congestion        3. Chest congestion               Long talk. Treatment options reviewed.    Reviewed most recent lab work with patient. Advised patient to remain up to date on routine maintenance and health screening.      Discussed sinusitis. " Take Zithromax as discussed.  Over-the-counter cold medicine as needed for symptom relief    Continue and take your medications as prescribed.    Health Maintenance issues discussed.    Importance of healthy diet and regular exercise regimen discussed.      Follow-up as instructed or sooner if any problems or symptoms do not resolve as expected.      Scribe Attestation  By signing my name below, Debi BOSCH Scribe   attest that this documentation has been prepared under the direction and in the presence of Dontrell Blue MD.

## 2025-03-31 ENCOUNTER — TELEPHONE (OUTPATIENT)
Dept: PRIMARY CARE | Facility: CLINIC | Age: 45
End: 2025-03-31
Payer: COMMERCIAL

## 2025-03-31 DIAGNOSIS — Z13.220 SCREENING, LIPID: ICD-10-CM

## 2025-03-31 DIAGNOSIS — E53.8 B12 DEFICIENCY: ICD-10-CM

## 2025-03-31 DIAGNOSIS — R53.83 OTHER FATIGUE: ICD-10-CM

## 2025-03-31 DIAGNOSIS — N95.1 PERIMENOPAUSE: ICD-10-CM

## 2025-03-31 DIAGNOSIS — R73.9 ELEVATED BLOOD SUGAR: Primary | ICD-10-CM

## 2025-03-31 DIAGNOSIS — E55.9 VITAMIN D DEFICIENCY: ICD-10-CM

## 2025-03-31 NOTE — TELEPHONE ENCOUNTER
Dontrell Blue MD  [id number] [facility] Clerical1 hour ago (12:31 PM)       Have her get fasting labs as ordered 3 to 7 days before her appointment with me.  She may have to  these forms.  Thanks

## 2025-03-31 NOTE — TELEPHONE ENCOUNTER
Please advise. Labs pended.     Pre-appt bloodwork - April 7th  Received: Yesterday  Tee Laughlin [id number] [place] [address] Clinical Support Staff  Phone Number: [phone]       Hello, I have my annual appointment on April 7 with Dr. Blue and I’d like to get my blood work done ahead of time if possible so that we can discuss the results. Can he put in an order ahead of my annual appointment?    Also, I’d like to make sure that I get the following tests checked on my blood work as I have been having more and more perimenopause symptoms and I’m trying different vitamins to help:  CBC (complete blood count), CMP (chemistry), fasting lipid (cholesterol), TSH/T4 (thyroid level), vitamin D, ferritin, vitamin B12, hemoglobin A1c, fasting insulin, magnesium.    Thank you,  Tee

## 2025-04-07 ENCOUNTER — APPOINTMENT (OUTPATIENT)
Dept: PRIMARY CARE | Facility: CLINIC | Age: 45
End: 2025-04-07
Payer: COMMERCIAL

## 2025-04-07 VITALS
TEMPERATURE: 97.6 F | OXYGEN SATURATION: 99 % | RESPIRATION RATE: 18 BRPM | WEIGHT: 122.2 LBS | HEART RATE: 79 BPM | HEIGHT: 61 IN | SYSTOLIC BLOOD PRESSURE: 110 MMHG | DIASTOLIC BLOOD PRESSURE: 60 MMHG | BODY MASS INDEX: 23.07 KG/M2

## 2025-04-07 DIAGNOSIS — R53.83 OTHER FATIGUE: ICD-10-CM

## 2025-04-07 DIAGNOSIS — M79.645 PAIN OF LEFT THUMB: ICD-10-CM

## 2025-04-07 DIAGNOSIS — M77.9 TENDONITIS: ICD-10-CM

## 2025-04-07 DIAGNOSIS — G89.29 CHRONIC LOW BACK PAIN WITHOUT SCIATICA, UNSPECIFIED BACK PAIN LATERALITY: ICD-10-CM

## 2025-04-07 DIAGNOSIS — B00.1 COLD SORE: ICD-10-CM

## 2025-04-07 DIAGNOSIS — Z12.39 ENCOUNTER FOR SCREENING FOR MALIGNANT NEOPLASM OF BREAST, UNSPECIFIED SCREENING MODALITY: ICD-10-CM

## 2025-04-07 DIAGNOSIS — Z00.00 ROUTINE GENERAL MEDICAL EXAMINATION AT A HEALTH CARE FACILITY: Primary | ICD-10-CM

## 2025-04-07 DIAGNOSIS — M54.50 CHRONIC LOW BACK PAIN WITHOUT SCIATICA, UNSPECIFIED BACK PAIN LATERALITY: ICD-10-CM

## 2025-04-07 DIAGNOSIS — N95.1 PERIMENOPAUSE: ICD-10-CM

## 2025-04-07 DIAGNOSIS — G40.909 SEIZURE DISORDER (MULTI): ICD-10-CM

## 2025-04-07 PROCEDURE — 1036F TOBACCO NON-USER: CPT | Performed by: FAMILY MEDICINE

## 2025-04-07 PROCEDURE — 3008F BODY MASS INDEX DOCD: CPT | Performed by: FAMILY MEDICINE

## 2025-04-07 PROCEDURE — 99396 PREV VISIT EST AGE 40-64: CPT | Performed by: FAMILY MEDICINE

## 2025-04-07 PROCEDURE — 99212 OFFICE O/P EST SF 10 MIN: CPT | Performed by: FAMILY MEDICINE

## 2025-04-07 RX ORDER — VALACYCLOVIR HYDROCHLORIDE 500 MG/1
500 TABLET, FILM COATED ORAL 2 TIMES DAILY
Qty: 14 TABLET | Refills: 1 | Status: SHIPPED | OUTPATIENT
Start: 2025-04-07

## 2025-04-07 RX ORDER — METHYLPREDNISOLONE 4 MG/1
TABLET ORAL
Qty: 21 TABLET | Refills: 0 | Status: SHIPPED | OUTPATIENT
Start: 2025-04-07

## 2025-04-07 ASSESSMENT — PATIENT HEALTH QUESTIONNAIRE - PHQ9
SUM OF ALL RESPONSES TO PHQ9 QUESTIONS 1 AND 2: 0
1. LITTLE INTEREST OR PLEASURE IN DOING THINGS: NOT AT ALL
2. FEELING DOWN, DEPRESSED OR HOPELESS: NOT AT ALL

## 2025-04-07 NOTE — PROGRESS NOTES
"Subjective   Patient ID: Tee Laughlin is a 44 y.o. female who presents for Annual Exam.  HPI    Patient presents in office today for an Annual physical. Last eye exam unknown, last dental exam 6 months ago. No new family h/o of cancer or heart disease. Does not need paperwork filled out today.      Patient reports trouble sleeping.     Patient reports left sided thumb pain.     Labs ordered 3/31/25    Taking current medications which were reviewed.  Problem list discussed.    Overall doing well.  Eating okay.  Staying active.    Has no other new problem /question.     ROS  Constitutional- No activity change. No appetite change.  Eyes- Denies vision changes.  Respiratory- No shortness of breath.  Cardiovascular- No palpitations. No chest pain.  GI- No nausea or vomiting. No diarrhea or constipation. Denies abdominal pain.  Musculoskeletal- Denies joint swelling.  Extremities- No edema.  Neurological- Denies headaches. Denies dizziness.  Skin- No rashes.  Psychiatric/Behavioral- Denies significant anxiety, or depressed mood.     Objective     /60   Pulse 79   Temp 36.4 °C (97.6 °F) (Temporal)   Resp 18   Ht 1.549 m (5' 1\")   Wt 55.4 kg (122 lb 3.2 oz)   SpO2 99%   BMI 23.09 kg/m²     No Known Allergies    Constitutional-- Well-nourished.  No distress  Head- unremarkable.  Ears- TMs clear.  Eyes- PERRL.  Conjunctiva normal.  Nose- Normal.  No rhinorrhea noted.  Throat- Oropharynx is clear and moist.  Neck- Supple with no thyromegaly.  No significant cervical adenopathy noted.  Pulmonary/Chest- Breath sounds normal with normal effort.  No wheezing.  Heart- Regular rate and rhythm.  No murmur.  Abdomen- Soft and non-tender.  No masses noted.  Musculoskeletal- Normal ROM.  No significant joint swelling  Extremities- No edema.   Neurological- Alert.  No noted deficits.  Skin- Warm.  No rashes.  Psychiatric/Behavioral- Mood and affect normal.  Behavior normal.     Assessment/Plan   1. Routine general " medical examination at a health care facility        2. Perimenopause        3. Encounter for screening for malignant neoplasm of breast, unspecified screening modality  BI mammo bilateral screening tomosynthesis      4. Seizure disorder (Multi)        5. Other fatigue        6. Cold sore  valACYclovir (Valtrex) 500 mg tablet      7. Chronic low back pain without sciatica, unspecified back pain laterality  Referral to Physical Therapy      8. Pain of left thumb  methylPREDNISolone (Medrol Dospak) 4 mg tablets      9. Tendonitis  methylPREDNISolone (Medrol Dospak) 4 mg tablets             Long talk. Treatment options reviewed.    Reviewed most recent lab work with patient. Advised patient to remain up to date on routine maintenance and health screening.  Maintain appointments with specialists as scheduled.  Advised patient to remain up to date on immunizations.     Discussed back pain. Referral placed with Physical therapy.     Discussed thumb pain. Discussed tendonitis and related symptoms. Take Methylprednisolone as discussed. Continue to monitor.     Educated on sleep hygiene.  We use over-the-counter Benadryl.  If not effective we will consider other options    Will see her gynecologist in the next few months.  Will discuss perimenopausal symptoms    Discussed importance of natural sources of nutrition.  Advised patient to consume vegetables, salads, fruits, nuts, and proteins such as fish and chicken.  Discussed portion control.      Discussed the importance of routine stretching and exercise.     Continue and take your medications as prescribed.    Health Maintenance issues discussed.    Importance of healthy diet and regular exercise regimen discussed.    We will contact you with any test results ordered. If you do not hear from us, please contact.    Follow-up as instructed or sooner if any problems or symptoms do not resolve as expected.      Scribe Attestation  By signing my name below, I, Debi Azul ,  Scribe   attest that this documentation has been prepared under the direction and in the presence of Dontrell Blue MD.

## 2025-04-10 LAB
25(OH)D3+25(OH)D2 SERPL-MCNC: 44 NG/ML (ref 30–100)
ALBUMIN SERPL-MCNC: 4.4 G/DL (ref 3.6–5.1)
ALP SERPL-CCNC: 56 U/L (ref 31–125)
ALT SERPL-CCNC: 16 U/L (ref 6–29)
ANION GAP SERPL CALCULATED.4IONS-SCNC: 8 MMOL/L (CALC) (ref 7–17)
AST SERPL-CCNC: 18 U/L (ref 10–35)
BASOPHILS # BLD AUTO: 44 CELLS/UL (ref 0–200)
BASOPHILS NFR BLD AUTO: 0.5 %
BILIRUB SERPL-MCNC: 0.5 MG/DL (ref 0.2–1.2)
BUN SERPL-MCNC: 12 MG/DL (ref 7–25)
CALCIUM SERPL-MCNC: 8.8 MG/DL (ref 8.6–10.2)
CHLORIDE SERPL-SCNC: 105 MMOL/L (ref 98–110)
CHOLEST SERPL-MCNC: 184 MG/DL
CHOLEST/HDLC SERPL: 2.4 (CALC)
CO2 SERPL-SCNC: 27 MMOL/L (ref 20–32)
CREAT SERPL-MCNC: 0.82 MG/DL (ref 0.5–0.99)
EGFRCR SERPLBLD CKD-EPI 2021: 89 ML/MIN/1.73M2
EOSINOPHIL # BLD AUTO: 96 CELLS/UL (ref 15–500)
EOSINOPHIL NFR BLD AUTO: 1.1 %
ERYTHROCYTE [DISTWIDTH] IN BLOOD BY AUTOMATED COUNT: 11.3 % (ref 11–15)
EST. AVERAGE GLUCOSE BLD GHB EST-MCNC: 94 MG/DL
EST. AVERAGE GLUCOSE BLD GHB EST-SCNC: 5.2 MMOL/L
FERRITIN SERPL-MCNC: 28 NG/ML (ref 16–232)
GLUCOSE SERPL-MCNC: 80 MG/DL (ref 65–99)
HBA1C MFR BLD: 4.9 % OF TOTAL HGB
HCT VFR BLD AUTO: 42.2 % (ref 35–45)
HDLC SERPL-MCNC: 77 MG/DL
HGB BLD-MCNC: 14 G/DL (ref 11.7–15.5)
INSULIN SERPL-ACNC: 5.8 UIU/ML
LDLC SERPL CALC-MCNC: 90 MG/DL (CALC)
LYMPHOCYTES # BLD AUTO: 3080 CELLS/UL (ref 850–3900)
LYMPHOCYTES NFR BLD AUTO: 35.4 %
MAGNESIUM SERPL-MCNC: 2.2 MG/DL (ref 1.5–2.5)
MCH RBC QN AUTO: 32 PG (ref 27–33)
MCHC RBC AUTO-ENTMCNC: 33.2 G/DL (ref 32–36)
MCV RBC AUTO: 96.6 FL (ref 80–100)
MONOCYTES # BLD AUTO: 574 CELLS/UL (ref 200–950)
MONOCYTES NFR BLD AUTO: 6.6 %
NEUTROPHILS # BLD AUTO: 4907 CELLS/UL (ref 1500–7800)
NEUTROPHILS NFR BLD AUTO: 56.4 %
NONHDLC SERPL-MCNC: 107 MG/DL (CALC)
PLATELET # BLD AUTO: 245 THOUSAND/UL (ref 140–400)
PMV BLD REES-ECKER: 10.7 FL (ref 7.5–12.5)
POTASSIUM SERPL-SCNC: 4 MMOL/L (ref 3.5–5.3)
PROT SERPL-MCNC: 7 G/DL (ref 6.1–8.1)
RBC # BLD AUTO: 4.37 MILLION/UL (ref 3.8–5.1)
SODIUM SERPL-SCNC: 140 MMOL/L (ref 135–146)
T4 FREE SERPL-MCNC: 1.1 NG/DL (ref 0.8–1.8)
TRIGL SERPL-MCNC: 78 MG/DL
TSH SERPL-ACNC: 5.78 MIU/L
VIT B12 SERPL-MCNC: 379 PG/ML (ref 200–1100)
WBC # BLD AUTO: 8.7 THOUSAND/UL (ref 3.8–10.8)

## 2025-05-07 ENCOUNTER — HOSPITAL ENCOUNTER (OUTPATIENT)
Dept: RADIOLOGY | Facility: HOSPITAL | Age: 45
Discharge: HOME | End: 2025-05-07
Payer: COMMERCIAL

## 2025-05-07 DIAGNOSIS — Z12.39 ENCOUNTER FOR SCREENING FOR MALIGNANT NEOPLASM OF BREAST, UNSPECIFIED SCREENING MODALITY: ICD-10-CM

## 2025-05-07 DIAGNOSIS — Z12.31 ENCOUNTER FOR SCREENING MAMMOGRAM FOR MALIGNANT NEOPLASM OF BREAST: ICD-10-CM

## 2025-05-07 PROCEDURE — 77067 SCR MAMMO BI INCL CAD: CPT | Mod: BILATERAL PROCEDURE | Performed by: RADIOLOGY

## 2025-05-07 PROCEDURE — 77063 BREAST TOMOSYNTHESIS BI: CPT | Mod: BILATERAL PROCEDURE | Performed by: RADIOLOGY

## 2025-05-07 PROCEDURE — 77067 SCR MAMMO BI INCL CAD: CPT

## 2025-05-14 ENCOUNTER — TELEPHONE (OUTPATIENT)
Dept: PRIMARY CARE | Facility: CLINIC | Age: 45
End: 2025-05-14
Payer: COMMERCIAL

## 2025-05-14 NOTE — TELEPHONE ENCOUNTER
----- Message from Dontrell Blue sent at 5/14/2025  4:22 PM EDT -----  Labs are all within normal limits or stable except for slightly low thyroid level.  Recommend starting levothyroxine 25 mcg daily 30 pills with 3 refills.  Then repeat TSH and free T4 level in 1   month after starting new medication.  Please let her know and arrange.  Thanks  ----- Message -----  From: [employer] Results In  Sent: 4/9/2025   9:54 PM EDT  To: Dontrell Blue MD

## 2025-05-16 NOTE — TELEPHONE ENCOUNTER
Patient returned call - she didn't feel comfortable starting the medication without talking to Dr. Blue about it - scheduled follow up appt.

## 2025-06-06 ENCOUNTER — APPOINTMENT (OUTPATIENT)
Dept: PRIMARY CARE | Facility: CLINIC | Age: 45
End: 2025-06-06
Payer: COMMERCIAL

## 2025-06-06 DIAGNOSIS — E05.90 HYPERTHYROIDISM: ICD-10-CM

## 2025-06-06 RX ORDER — LEVOTHYROXINE SODIUM 25 UG/1
25 TABLET ORAL DAILY
Qty: 30 TABLET | Refills: 3 | Status: SHIPPED | OUTPATIENT
Start: 2025-06-06

## 2025-06-06 RX ORDER — LEVOTHYROXINE SODIUM 25 UG/1
25 TABLET ORAL DAILY
COMMUNITY
End: 2025-06-06 | Stop reason: SDUPTHER

## 2025-06-06 NOTE — TELEPHONE ENCOUNTER
Dontrell Blue MD to [id number] [place] [address] Clinical Support Staff       5/14/25  4:22 PM  Result Note  Labs are all within normal limits or stable except for slightly low thyroid level.  Recommend starting levothyroxine 25 mcg daily 30 pills with 3 refills.  Then repeat TSH and free T4 level in 1 month after starting new medication.

## 2025-06-06 NOTE — TELEPHONE ENCOUNTER
Tee HERNANDEZ [id number] [facility] Clinical Support Staff (supporting Dontrell Blue MD) (Selected Message)  RJ      6/4/25  8:58 AM  Hello, I’m following up because I forgot the name of the medication that Dr. Fair wants to put me on for the elevated TSH lab work that just came back. Could you please send me that medication name and prescription amount.? Thank you.

## 2025-07-02 ENCOUNTER — APPOINTMENT (OUTPATIENT)
Dept: PRIMARY CARE | Facility: CLINIC | Age: 45
End: 2025-07-02
Payer: COMMERCIAL

## 2025-07-02 VITALS
BODY MASS INDEX: 21.71 KG/M2 | RESPIRATION RATE: 18 BRPM | HEIGHT: 62 IN | SYSTOLIC BLOOD PRESSURE: 100 MMHG | DIASTOLIC BLOOD PRESSURE: 62 MMHG | WEIGHT: 118 LBS

## 2025-07-02 DIAGNOSIS — E03.9 HYPOTHYROIDISM, UNSPECIFIED TYPE: Primary | ICD-10-CM

## 2025-07-02 DIAGNOSIS — G40.909 SEIZURE DISORDER (MULTI): ICD-10-CM

## 2025-07-02 PROCEDURE — 99213 OFFICE O/P EST LOW 20 MIN: CPT | Performed by: FAMILY MEDICINE

## 2025-07-02 PROCEDURE — 3008F BODY MASS INDEX DOCD: CPT | Performed by: FAMILY MEDICINE

## 2025-07-02 RX ORDER — LEVOTHYROXINE SODIUM 25 UG/1
25 TABLET ORAL DAILY
Qty: 90 TABLET | Refills: 3 | Status: SHIPPED | OUTPATIENT
Start: 2025-07-02

## 2025-07-02 NOTE — PROGRESS NOTES
"Subjective   Patient ID: Tee Laughlin is a 44 y.o. female who presents for reaction to blood donation and Hypothyroidism.  HPI    Hypothyroidism  Taking Synthroid 25 mcg  Taking on an empty stomach  No significant weight loss/gain  No heat/cold intolerances  No increase in hair loss/dry skin     Patient admits that she gave blood last Thursday and had a bad reaction to it. Patient admits that she has done this multiple times in the past. Patient admits to a history of increased iron in her blood. Admits that on Thursday she got home and had cramping, sweating, loose cristy. Thought this was due to her period. Patient admits to vomiting 3 times and things felt fine afterwords. Patinet admits that she did have packaged cookies and a juice from the blood donation place. Patient admits that all weekend she felt very dizzy, lightheaded and had a migraine for 3-4 days as well.     Taking current medications which were reviewed.  Problem list discussed.    Overall doing well.  Eating okay.  Staying active.    Has no other new problem /question.     ROS  Constitutional- No activity change. No appetite change.  Eyes- Denies vision changes.  Respiratory- No shortness of breath.  Cardiovascular- No palpitations. No chest pain.  GI- No nausea or vomiting. No diarrhea or constipation. Denies abdominal pain.  Musculoskeletal- Denies joint swelling.  Extremities- No edema.  Neurological-  Denies dizziness.  Skin- No rashes.  Psychiatric/Behavioral- Denies significant anxiety, or depressed mood.     Objective     /62   Resp 18   Ht 1.562 m (5' 1.5\")   Wt 53.5 kg (118 lb)   BMI 21.93 kg/m²     Allergies[1]    Constitutional-- Well-nourished.  No distress  Head- unremarkable.  Eyes- PERRL.  Conjunctiva normal.  Nose- Normal.  No rhinorrhea noted.  Throat- Oropharynx is clear and moist.  Neck- Supple with no thyromegaly.  No significant cervical adenopathy noted.  Pulmonary/Chest- Breath sounds normal with normal effort. "  No wheezing.  Heart- Regular rate and rhythm.  No murmur.  Abdomen- Soft and non-tender.  No masses noted.  Musculoskeletal- Normal ROM.  No significant joint swelling  Extremities- No edema.   Neurological- Alert.  No noted deficits.  Skin- Warm.  No rashes.  Psychiatric/Behavioral- Mood and affect normal.  Behavior normal.     Assessment/Plan   1. Hypothyroidism, unspecified type  Thyroid Stimulating Hormone    Thyroxine, Free    Thyroid Stimulating Hormone    Thyroxine, Free      2. Seizure disorder (Multi)               Long talk. Treatment options reviewed.    Reviewed most recent lab work with patient. Advised patient to remain up to date on routine maintenance and health screening.  Maintain appointments with specialists as scheduled.  Advised patient to remain up to date on immunizations.     Discussed hypothyroidism. Complete blood work as discussed. Advised patient to remain properly hydrated.     Talked about probable vasovagal reaction.  Things to do to minimize this from happening in the future discussed.    Discussed importance of natural sources of nutrition.  Advised patient to consume vegetables, salads, fruits, nuts, and proteins such as fish and chicken.  Discussed portion control.      Discussed the importance of routine stretching and exercise.     Continue and take your medications as prescribed.    Health Maintenance issues discussed.    Importance of healthy diet and regular exercise regimen discussed.    We will contact you with any test results ordered. If you do not hear from us, please contact.    Follow-up as instructed or sooner if any problems or symptoms do not resolve as expected.    All medical record entries made by the Scribe were at my direction and personally dictated by me.    I have reviewed the chart and agree that the record accurately reflects my personal performance of the history, physical exam, discussion, and plan.        Scribe Attestation  By signing my name below,  I, Esvin Garza   attest that this documentation has been prepared under the direction and in the presence of Dontrell Blue MD.         [1] No Known Allergies

## 2025-08-27 ENCOUNTER — TELEPHONE (OUTPATIENT)
Dept: PRIMARY CARE | Facility: CLINIC | Age: 45
End: 2025-08-27
Payer: COMMERCIAL

## 2025-08-27 DIAGNOSIS — G89.29 CHRONIC LOW BACK PAIN WITHOUT SCIATICA, UNSPECIFIED BACK PAIN LATERALITY: Primary | ICD-10-CM

## 2025-08-27 DIAGNOSIS — M54.50 CHRONIC LOW BACK PAIN WITHOUT SCIATICA, UNSPECIFIED BACK PAIN LATERALITY: Primary | ICD-10-CM

## 2026-04-08 ENCOUNTER — APPOINTMENT (OUTPATIENT)
Dept: PRIMARY CARE | Facility: CLINIC | Age: 46
End: 2026-04-08
Payer: COMMERCIAL